# Patient Record
Sex: MALE | Employment: FULL TIME | URBAN - METROPOLITAN AREA
[De-identification: names, ages, dates, MRNs, and addresses within clinical notes are randomized per-mention and may not be internally consistent; named-entity substitution may affect disease eponyms.]

---

## 2017-02-28 ENCOUNTER — OFFICE VISIT (OUTPATIENT)
Dept: FAMILY MEDICINE CLINIC | Age: 37
End: 2017-02-28

## 2017-02-28 VITALS
HEART RATE: 72 BPM | TEMPERATURE: 97.9 F | DIASTOLIC BLOOD PRESSURE: 70 MMHG | RESPIRATION RATE: 12 BRPM | SYSTOLIC BLOOD PRESSURE: 102 MMHG | WEIGHT: 134 LBS | HEIGHT: 69 IN | BODY MASS INDEX: 19.85 KG/M2 | OXYGEN SATURATION: 99 %

## 2017-02-28 DIAGNOSIS — R10.31 RIGHT LOWER QUADRANT ABDOMINAL PAIN: ICD-10-CM

## 2017-02-28 DIAGNOSIS — R10.31 RIGHT LOWER QUADRANT ABDOMINAL PAIN: Primary | ICD-10-CM

## 2017-02-28 LAB
A/G RATIO: 2 (ref 1.1–2.2)
ALBUMIN SERPL-MCNC: 5.1 G/DL (ref 3.4–5)
ALP BLD-CCNC: 53 U/L (ref 40–129)
ALT SERPL-CCNC: 23 U/L (ref 10–40)
ANION GAP SERPL CALCULATED.3IONS-SCNC: 16 MMOL/L (ref 3–16)
AST SERPL-CCNC: 22 U/L (ref 15–37)
BASOPHILS ABSOLUTE: 0 K/UL (ref 0–0.2)
BASOPHILS RELATIVE PERCENT: 0.3 %
BILIRUB SERPL-MCNC: 0.6 MG/DL (ref 0–1)
BILIRUBIN, POC: ABNORMAL
BLOOD URINE, POC: ABNORMAL
BUN BLDV-MCNC: 14 MG/DL (ref 7–20)
CALCIUM SERPL-MCNC: 9.6 MG/DL (ref 8.3–10.6)
CHLORIDE BLD-SCNC: 99 MMOL/L (ref 99–110)
CLARITY, POC: CLEAR
CO2: 25 MMOL/L (ref 21–32)
COLOR, POC: YELLOW
CREAT SERPL-MCNC: 0.9 MG/DL (ref 0.9–1.3)
EOSINOPHILS ABSOLUTE: 0 K/UL (ref 0–0.6)
EOSINOPHILS RELATIVE PERCENT: 0.5 %
GFR AFRICAN AMERICAN: >60
GFR NON-AFRICAN AMERICAN: >60
GLOBULIN: 2.6 G/DL
GLUCOSE BLD-MCNC: 88 MG/DL (ref 70–99)
GLUCOSE URINE, POC: ABNORMAL
HCT VFR BLD CALC: 43.1 % (ref 40.5–52.5)
HEMOGLOBIN: 14.8 G/DL (ref 13.5–17.5)
KETONES, POC: ABNORMAL
LEUKOCYTE EST, POC: ABNORMAL
LYMPHOCYTES ABSOLUTE: 2.4 K/UL (ref 1–5.1)
LYMPHOCYTES RELATIVE PERCENT: 43.6 %
MCH RBC QN AUTO: 31.7 PG (ref 26–34)
MCHC RBC AUTO-ENTMCNC: 34.4 G/DL (ref 31–36)
MCV RBC AUTO: 92.2 FL (ref 80–100)
MONOCYTES ABSOLUTE: 0.5 K/UL (ref 0–1.3)
MONOCYTES RELATIVE PERCENT: 9.1 %
NEUTROPHILS ABSOLUTE: 2.6 K/UL (ref 1.7–7.7)
NEUTROPHILS RELATIVE PERCENT: 46.5 %
NITRITE, POC: ABNORMAL
PDW BLD-RTO: 12.9 % (ref 12.4–15.4)
PH, POC: 5.5
PLATELET # BLD: 277 K/UL (ref 135–450)
PMV BLD AUTO: 7.4 FL (ref 5–10.5)
POTASSIUM SERPL-SCNC: 4.2 MMOL/L (ref 3.5–5.1)
PROTEIN, POC: ABNORMAL
RBC # BLD: 4.67 M/UL (ref 4.2–5.9)
SODIUM BLD-SCNC: 140 MMOL/L (ref 136–145)
SPECIFIC GRAVITY, POC: 1.03
TOTAL PROTEIN: 7.7 G/DL (ref 6.4–8.2)
UROBILINOGEN, POC: 0.2
WBC # BLD: 5.5 K/UL (ref 4–11)

## 2017-02-28 PROCEDURE — 99202 OFFICE O/P NEW SF 15 MIN: CPT | Performed by: FAMILY MEDICINE

## 2017-02-28 PROCEDURE — 81001 URINALYSIS AUTO W/SCOPE: CPT | Performed by: FAMILY MEDICINE

## 2017-03-01 LAB
BACTERIA: ABNORMAL /HPF
BILIRUBIN URINE: NEGATIVE
BLOOD, URINE: NEGATIVE
CLARITY: CLEAR
COLOR: ABNORMAL
EPITHELIAL CELLS, UA: ABNORMAL /HPF
GLUCOSE URINE: NEGATIVE MG/DL
KETONES, URINE: NEGATIVE MG/DL
LEUKOCYTE ESTERASE, URINE: NEGATIVE
MICROSCOPIC EXAMINATION: ABNORMAL
MUCUS: ABNORMAL /LPF
NITRITE, URINE: NEGATIVE
PH UA: 5.5
PROTEIN UA: NEGATIVE MG/DL
RBC UA: ABNORMAL /HPF (ref 0–2)
SPECIFIC GRAVITY UA: 1.03
UROBILINOGEN, URINE: 0.2 E.U./DL
WBC UA: ABNORMAL /HPF (ref 0–5)

## 2017-03-03 ASSESSMENT — ENCOUNTER SYMPTOMS
RESPIRATORY NEGATIVE: 1
ABDOMINAL PAIN: 1

## 2017-05-19 ENCOUNTER — OFFICE VISIT (OUTPATIENT)
Dept: FAMILY MEDICINE CLINIC | Age: 37
End: 2017-05-19

## 2017-05-19 VITALS
BODY MASS INDEX: 19.76 KG/M2 | OXYGEN SATURATION: 98 % | TEMPERATURE: 97.7 F | WEIGHT: 133.4 LBS | DIASTOLIC BLOOD PRESSURE: 62 MMHG | HEART RATE: 70 BPM | SYSTOLIC BLOOD PRESSURE: 98 MMHG | RESPIRATION RATE: 16 BRPM | HEIGHT: 69 IN

## 2017-05-19 DIAGNOSIS — F34.1 DYSTHYMIA: ICD-10-CM

## 2017-05-19 DIAGNOSIS — Z00.00 WELL ADULT ON ROUTINE HEALTH CHECK: Primary | ICD-10-CM

## 2017-05-19 DIAGNOSIS — Z00.00 WELL ADULT ON ROUTINE HEALTH CHECK: ICD-10-CM

## 2017-05-19 LAB
CHOLESTEROL, TOTAL: 167 MG/DL (ref 0–199)
HDLC SERPL-MCNC: 51 MG/DL (ref 40–60)
LDL CHOLESTEROL CALCULATED: 103 MG/DL
TRIGL SERPL-MCNC: 64 MG/DL (ref 0–150)
TSH REFLEX FT4: 1.9 UIU/ML (ref 0.27–4.2)
VITAMIN D 25-HYDROXY: 18.7 NG/ML
VLDLC SERPL CALC-MCNC: 13 MG/DL

## 2017-05-19 PROCEDURE — 99395 PREV VISIT EST AGE 18-39: CPT | Performed by: FAMILY MEDICINE

## 2017-05-19 PROCEDURE — 96160 PT-FOCUSED HLTH RISK ASSMT: CPT | Performed by: FAMILY MEDICINE

## 2017-05-19 ASSESSMENT — PATIENT HEALTH QUESTIONNAIRE - PHQ9
10. IF YOU CHECKED OFF ANY PROBLEMS, HOW DIFFICULT HAVE THESE PROBLEMS MADE IT FOR YOU TO DO YOUR WORK, TAKE CARE OF THINGS AT HOME, OR GET ALONG WITH OTHER PEOPLE: 0
1. LITTLE INTEREST OR PLEASURE IN DOING THINGS: 1
8. MOVING OR SPEAKING SO SLOWLY THAT OTHER PEOPLE COULD HAVE NOTICED. OR THE OPPOSITE, BEING SO FIGETY OR RESTLESS THAT YOU HAVE BEEN MOVING AROUND A LOT MORE THAN USUAL: 0
3. TROUBLE FALLING OR STAYING ASLEEP: 3
SUM OF ALL RESPONSES TO PHQ QUESTIONS 1-9: 7
2. FEELING DOWN, DEPRESSED OR HOPELESS: 2
4. FEELING TIRED OR HAVING LITTLE ENERGY: 1
7. TROUBLE CONCENTRATING ON THINGS, SUCH AS READING THE NEWSPAPER OR WATCHING TELEVISION: 0
5. POOR APPETITE OR OVEREATING: 0
SUM OF ALL RESPONSES TO PHQ9 QUESTIONS 1 & 2: 3

## 2017-05-20 PROBLEM — F34.1 DYSTHYMIA: Status: ACTIVE | Noted: 2017-05-20

## 2017-05-20 ASSESSMENT — ENCOUNTER SYMPTOMS
RESPIRATORY NEGATIVE: 1
EYES NEGATIVE: 1
GASTROINTESTINAL NEGATIVE: 1

## 2017-05-21 DIAGNOSIS — E55.9 VITAMIN D DEFICIENCY: Primary | ICD-10-CM

## 2017-05-21 LAB — HIV-1 AND HIV-2 ANTIBODIES: NEGATIVE

## 2017-05-21 RX ORDER — ERGOCALCIFEROL 1.25 MG/1
50000 CAPSULE ORAL WEEKLY
Qty: 12 CAPSULE | Refills: 0 | Status: SHIPPED | OUTPATIENT
Start: 2017-05-21 | End: 2021-08-11

## 2018-06-12 ENCOUNTER — OFFICE VISIT (OUTPATIENT)
Dept: FAMILY MEDICINE CLINIC | Age: 38
End: 2018-06-12

## 2018-06-12 VITALS
RESPIRATION RATE: 12 BRPM | WEIGHT: 134 LBS | BODY MASS INDEX: 19.79 KG/M2 | TEMPERATURE: 99 F | DIASTOLIC BLOOD PRESSURE: 74 MMHG | SYSTOLIC BLOOD PRESSURE: 120 MMHG | HEART RATE: 78 BPM

## 2018-06-12 DIAGNOSIS — L03.119 CELLULITIS OF LOWER EXTREMITY, UNSPECIFIED LATERALITY: ICD-10-CM

## 2018-06-12 DIAGNOSIS — R21 ACUTE BLISTERING ERUPTION OF SKIN: Primary | ICD-10-CM

## 2018-06-12 PROCEDURE — G8427 DOCREV CUR MEDS BY ELIG CLIN: HCPCS | Performed by: FAMILY MEDICINE

## 2018-06-12 PROCEDURE — 1036F TOBACCO NON-USER: CPT | Performed by: FAMILY MEDICINE

## 2018-06-12 PROCEDURE — G8420 CALC BMI NORM PARAMETERS: HCPCS | Performed by: FAMILY MEDICINE

## 2018-06-12 PROCEDURE — 99213 OFFICE O/P EST LOW 20 MIN: CPT | Performed by: FAMILY MEDICINE

## 2018-06-12 RX ORDER — BACITRACIN ZINC AND POLYMYXIN B SULFATE 500; 1000 [USP'U]/G; [USP'U]/G
OINTMENT TOPICAL
Qty: 30 G | Refills: 0 | Status: SHIPPED | OUTPATIENT
Start: 2018-06-12 | End: 2018-06-21 | Stop reason: SDUPTHER

## 2018-06-12 RX ORDER — SULFAMETHOXAZOLE AND TRIMETHOPRIM 800; 160 MG/1; MG/1
1 TABLET ORAL 2 TIMES DAILY
Qty: 20 TABLET | Refills: 0 | Status: SHIPPED | OUTPATIENT
Start: 2018-06-12 | End: 2018-06-22

## 2018-06-12 RX ORDER — CEPHALEXIN 500 MG/1
CAPSULE ORAL
COMMUNITY
Start: 2018-06-07 | End: 2021-08-11

## 2018-06-17 ASSESSMENT — ENCOUNTER SYMPTOMS: RESPIRATORY NEGATIVE: 1

## 2018-06-21 ENCOUNTER — OFFICE VISIT (OUTPATIENT)
Dept: FAMILY MEDICINE CLINIC | Age: 38
End: 2018-06-21

## 2018-06-21 VITALS
BODY MASS INDEX: 19.4 KG/M2 | DIASTOLIC BLOOD PRESSURE: 58 MMHG | RESPIRATION RATE: 16 BRPM | HEIGHT: 69 IN | WEIGHT: 131 LBS | HEART RATE: 83 BPM | SYSTOLIC BLOOD PRESSURE: 110 MMHG | TEMPERATURE: 98 F | OXYGEN SATURATION: 98 %

## 2018-06-21 DIAGNOSIS — L03.119 CELLULITIS OF LOWER EXTREMITY, UNSPECIFIED LATERALITY: Primary | ICD-10-CM

## 2018-06-21 PROCEDURE — G8427 DOCREV CUR MEDS BY ELIG CLIN: HCPCS | Performed by: FAMILY MEDICINE

## 2018-06-21 PROCEDURE — G8420 CALC BMI NORM PARAMETERS: HCPCS | Performed by: FAMILY MEDICINE

## 2018-06-21 PROCEDURE — 1036F TOBACCO NON-USER: CPT | Performed by: FAMILY MEDICINE

## 2018-06-21 PROCEDURE — 99213 OFFICE O/P EST LOW 20 MIN: CPT | Performed by: FAMILY MEDICINE

## 2018-06-21 RX ORDER — HYDROXYZINE HYDROCHLORIDE 25 MG/1
25 TABLET, FILM COATED ORAL 3 TIMES DAILY PRN
Qty: 15 TABLET | Refills: 0 | Status: SHIPPED | OUTPATIENT
Start: 2018-06-21 | End: 2018-07-01

## 2018-06-21 RX ORDER — BACITRACIN ZINC AND POLYMYXIN B SULFATE 500; 1000 [USP'U]/G; [USP'U]/G
OINTMENT TOPICAL
Qty: 30 G | Refills: 0 | Status: SHIPPED | OUTPATIENT
Start: 2018-06-21 | End: 2018-06-28

## 2018-06-27 ASSESSMENT — ENCOUNTER SYMPTOMS: RESPIRATORY NEGATIVE: 1

## 2021-08-08 NOTE — PROGRESS NOTES
Gianna Bryan   47 y.o. male   1980    This is patient's first visit with me. Gianna Bryan is here to establish care as their new PCP. Gianna Bryan has a PMH significant for:     Patient Active Problem List   Diagnosis    Dysthymia       Reason(s) for visit:   Chief Complaint   Patient presents with    Annual Exam    Established New Doctor       HPI:    Office visit encounter:    Patient has history of remote anxiety/depression. He has only been on Hydroxyzine PRN back in June 2018. He stated that lately, he has had no issues. Has had chronic sleep issues. No problems with falling asleep. He stated he's a light sleeper and it needs to be quiet and dark. He stated that he often wakes up around 3:30 AM and 4 AM.  He denied issues with nocturia > 2x. Works from 8:30 AM-5 PM.    Regarding his occupation, patient works for [employer] back in Nov 2020. Of note, no history of COVID-19. He is fully vaccinated against COVID-19. PDMP monitoring:  -Revealed no controlled medications written of any kind.    -Last report:   Last PDMP Ke Molina as Reviewed [facility]):  Review User Review Instant Review Result   [address], [address] 8/8/2021  6:07 PM Reviewed PDMP [1]       Allergies   Allergen Reactions    Seasonal        No current outpatient medications on file prior to visit. No current facility-administered medications on file prior to visit.         Family History   Problem Relation Age of Onset    Glaucoma Mother 79    Crohn's Disease Mother     Diabetes type 2  Father 46       Social History     Tobacco Use    Smoking status: Never Smoker    Smokeless tobacco: Never Used   Substance Use Topics    Alcohol use: Yes        Lab Results   Component Value Date    WBC 5.5 02/28/2017    HGB 14.8 02/28/2017    HCT 43.1 02/28/2017    MCV 92.2 02/28/2017     02/28/2017       Chemistry        Component Value Date/Time     02/28/2017 1643    K 4.2 02/28/2017 1643    CL 99 02/28/2017 1643    CO2 25 02/28/2017 1643    BUN 14 02/28/2017 1643    CREATININE 0.9 02/28/2017 1643        Component Value Date/Time    CALCIUM 9.6 02/28/2017 1643    ALKPHOS 53 02/28/2017 1643    AST 22 02/28/2017 1643    ALT 23 02/28/2017 1643    BILITOT 0.6 02/28/2017 1643          Lab Results   Component Value Date    ALT 23 02/28/2017    AST 22 02/28/2017    ALKPHOS 53 02/28/2017    BILITOT 0.6 02/28/2017     No results found for: LABA1C  No results found for: EAG    Review of Systems   Constitutional: Negative for activity change, appetite change, fatigue, fever and unexpected weight change. HENT: Negative for congestion, rhinorrhea, sinus pressure and trouble swallowing. Respiratory: Negative for cough, chest tightness, shortness of breath and wheezing. Cardiovascular: Negative for chest pain, palpitations and leg swelling. Gastrointestinal: Negative for abdominal distention, abdominal pain, blood in stool, constipation, diarrhea, nausea and vomiting. Genitourinary: Negative for dysuria, frequency and hematuria. Musculoskeletal: Negative for arthralgias and back pain. Skin: Negative for rash. Neurological: Negative for dizziness, weakness, light-headedness, numbness and headaches. Psychiatric/Behavioral: Positive for sleep disturbance. Wt Readings from Last 3 Encounters:   08/11/21 138 lb 6.4 oz (62.8 kg)   06/21/18 131 lb (59.4 kg)   06/12/18 134 lb (60.8 kg)       BP Readings from Last 3 Encounters:   08/11/21 100/64   06/21/18 (!) 110/58   06/12/18 120/74       Pulse Readings from Last 3 Encounters:   08/11/21 72   06/21/18 83   06/12/18 78       /64   Pulse 72   Temp 97.5 °F (36.4 °C)   Ht 5' 9\" (1.753 m)   Wt 138 lb 6.4 oz (62.8 kg)   SpO2 98%   BMI 20.44 kg/m²      Physical Exam  Vitals reviewed. Constitutional:       General: He is awake. He is not in acute distress. Appearance: He is not ill-appearing or diaphoretic. HENT:      Head: Normocephalic and atraumatic. No abrasion or masses. Hair is normal.      Right Ear: External ear normal.      Left Ear: External ear normal.      Nose: Nose normal.   Eyes:      General: Lids are normal. Gaze aligned appropriately. No scleral icterus. Right eye: No discharge. Left eye: No discharge. Extraocular Movements: Extraocular movements intact. Conjunctiva/sclera: Conjunctivae normal.   Neck:      Trachea: Phonation normal.   Cardiovascular:      Rate and Rhythm: Normal rate and regular rhythm. Pulmonary:      Effort: Pulmonary effort is normal. No respiratory distress. Breath sounds: No wheezing, rhonchi or rales. Abdominal:      General: Abdomen is flat. There is no distension. Palpations: Abdomen is soft. Musculoskeletal:         General: No deformity. Normal range of motion. Cervical back: Normal range of motion. No erythema. Right lower leg: No edema. Left lower leg: No edema. Skin:     Coloration: Skin is not cyanotic, jaundiced or pale. Findings: No abrasion, abscess, bruising, ecchymosis, erythema, signs of injury, laceration, lesion, petechiae, rash or wound. Neurological:      General: No focal deficit present. Mental Status: He is alert. Mental status is at baseline. GCS: GCS eye subscore is 4. GCS verbal subscore is 5. GCS motor subscore is 6. Cranial Nerves: No cranial nerve deficit, dysarthria or facial asymmetry. Motor: No weakness, tremor, atrophy or seizure activity. Coordination: Coordination normal.      Gait: Gait is intact. Psychiatric:         Attention and Perception: Attention and perception normal.         Mood and Affect: Mood and affect normal.         Speech: Speech normal.         Behavior: Behavior normal. Behavior is cooperative. Thought Content: Thought content normal.       Assessment/Plan:   Ruy Pressley was seen today for annual exam and established new doctor.     Diagnoses and all orders for this visit:    Encounter for preventative adult health care examination  -     CBC Auto Differential; Future  -     Hemoglobin A1C; Future  -     TSH without Reflex; Future  -     T4, Free; Future  -     Renal Function Panel; Future  -     Hepatic Function Panel; Future  -     Lipid Panel; Future    Vitamin D deficiency  -     VITAMIN D 25 HYDROXY; Future    Encounter to establish care with new doctor    Educated about 2019 novel coronavirus infection       I reviewed the plan of care with the patient. Patient acknowledged understanding and agreed with plan of care overall. Medications Discontinued During This Encounter   Medication Reason    cephALEXin (KEFLEX) 500 MG capsule LIST CLEANUP    mupirocin (BACTROBAN) 2 % ointment LIST CLEANUP    vitamin D (ERGOCALCIFEROL) 48381 UNITS CAPS capsule LIST CLEANUP        General information on medications:  -When it comes to medications, whether with starting or adding a new medication or increasing the dose of a current medication, the benefits and risks have to always be considered and weighed over, especially if one is taking other medications as well. -There are no medications that have no side effects and that there is always a risk involved with taking a medication.    -If a side effect were to occur with starting a new medication or with increasing the dose of a current medication that either the medication can be totally discontinued altogether or simply decrease the dose of it and if this would be the case a follow-up appointment would be deemed necessary.    -The drug allergy list will then be updated with the corresponding side effect(s) if it's deemed to be a true 'drug allergy'.     -The most common adverse effects of medication(s) were addressed at today's visit.    -Lastly, the coverage status of a medication may vary from insurance to insurance and the only way to verify if the medication is covered is to send an actual prescription in.    -The drug formulary of each insurance changes without any warning or notification to the healthcare provider let alone the pharmacy.  -The cost of medications vary from insurance to insurance and the cost is always subject to change just like the drug formulary. Level of service (LOS):   -Multiple variables/factors are considered with determining LOS: duration of time spent with patient, time spent reviewing patient's labs, notes (including my own and other specialist's notes if relevant), number of issues addressed during the visit, answering any questions/concerns brought up by the patient, and the overall complexity and decision making regarding the issues addressed during the visit, etc.  -Length of visit is one factor (not the main one) in determining the LOS code selected. -There are different codes to distinguish new patient vs old (established) patient.  -There is a specific LOS/code for a (annual) physical (otherwise known as a wellness visit or biometric screening) can only be used once a year. In addition, the healthcare provider will determine whether the current visit can be considered a 'physical.' Discussing about specific issues/concerns, especially if those issues/concerns are new does not constitute as a 'physical' visit. Follow-up: No follow-ups on file. .     Patient was informed that if his or her symptoms worsen to follow up with me sooner or go to the nearest ER if the symptoms are very significant and warrant higher level of care. Regarding my note:  -This note was composed (by me only and not with assistance via a scribe) to the best of my knowledge and recollection of the encounter with the patient using one of my own customized note templates utilizing a combination of typing and dictating with the [address] [place] [address] speech recognition software.   As a result, the note may possibly have various errors (e.g. spelling, grammar, and non-sensible words/phrases/statements) despite reviewing the note prior to signing it for completion.    -It is worth noting that most of the time, progress notes are completed usually long after the patient was seen. Every effort is made to have notes as well as other things that needed to be attended to (e.g. medication refills, MyChart messages, documents that require reviewing, etc.) be addressed and completed in a timely fashion (ideally within 72 hours of the patient encounter). -It is also worth noting that healthcare providers often see several patients a day (e.g. > 15-30 patients/day) in either the outpatient and/or inpatient setting(s). In addition, healthcare providers spend a significant amount of time reviewing multiple patients' charts in preparation for their future encounters (pre-charting) as well as time spent reviewing any labs, imaging, specialist's notes (if relevant), and other documents (e.g. recent ER visits or hospital stays or completing forms such as FMLA, short-term/long-term disability), etc.  Time is also allocated to attending to patient's messages on [employer], phone calls from various people, attending to prescription refills/orders, and also attending meetings or conferences throughout the day. Time and effort is also allocated to coordinating with office staff to make sure various things are completed as part of the day-to-day office management responsibilities. For the most part, substantial portion of each given day is usually spent with direct patient care followed by documenting.  -Given all this, it is worth pointing out that not every detail of the encounter can be remembered and not everything discussed is considered relevant, significant, or noteworthy. It is also worth mentioning that my recollection of the visit may differ from the respective patient's recollection of the visit.   This note is primarily written for myself (the healthcare provider) utilizing one of my own customized templates so I can recall what

## 2021-08-11 ENCOUNTER — OFFICE VISIT (OUTPATIENT)
Dept: FAMILY MEDICINE CLINIC | Age: 41
End: 2021-08-11
Payer: COMMERCIAL

## 2021-08-11 VITALS
HEART RATE: 72 BPM | HEIGHT: 69 IN | WEIGHT: 138.4 LBS | TEMPERATURE: 97.5 F | BODY MASS INDEX: 20.5 KG/M2 | OXYGEN SATURATION: 98 % | DIASTOLIC BLOOD PRESSURE: 64 MMHG | SYSTOLIC BLOOD PRESSURE: 100 MMHG

## 2021-08-11 DIAGNOSIS — E55.9 VITAMIN D DEFICIENCY: ICD-10-CM

## 2021-08-11 DIAGNOSIS — Z76.89 ENCOUNTER TO ESTABLISH CARE WITH NEW DOCTOR: ICD-10-CM

## 2021-08-11 DIAGNOSIS — Z00.00 ENCOUNTER FOR PREVENTATIVE ADULT HEALTH CARE EXAMINATION: Primary | ICD-10-CM

## 2021-08-11 DIAGNOSIS — Z71.89 EDUCATED ABOUT 2019 NOVEL CORONAVIRUS INFECTION: ICD-10-CM

## 2021-08-11 PROCEDURE — 99385 PREV VISIT NEW AGE 18-39: CPT | Performed by: FAMILY MEDICINE

## 2021-08-11 ASSESSMENT — PATIENT HEALTH QUESTIONNAIRE - PHQ9
SUM OF ALL RESPONSES TO PHQ QUESTIONS 1-9: 2
1. LITTLE INTEREST OR PLEASURE IN DOING THINGS: 1
SUM OF ALL RESPONSES TO PHQ QUESTIONS 1-9: 2
SUM OF ALL RESPONSES TO PHQ9 QUESTIONS 1 & 2: 2
SUM OF ALL RESPONSES TO PHQ QUESTIONS 1-9: 2
2. FEELING DOWN, DEPRESSED OR HOPELESS: 1

## 2021-08-11 ASSESSMENT — ENCOUNTER SYMPTOMS
SHORTNESS OF BREATH: 0
NAUSEA: 0
TROUBLE SWALLOWING: 0
BACK PAIN: 0
DIARRHEA: 0
ABDOMINAL PAIN: 0
SINUS PRESSURE: 0
ABDOMINAL DISTENTION: 0
BLOOD IN STOOL: 0
VOMITING: 0
RHINORRHEA: 0
CONSTIPATION: 0
COUGH: 0
WHEEZING: 0
CHEST TIGHTNESS: 0

## 2021-10-25 NOTE — PROGRESS NOTES
Elva Callahan   46 y.o. male   1980    HPI:    Patient was last seen by me on 8/11/2021. During our last office visit:   -This was his first visit with me to establish care as new PCP. Reason(s) for visit:   Chief Complaint   Patient presents with   [facility] Mass     Neck. Has been there for about 3mo. Not painful. Patient reported of a mass of his neck that had appeared around end of June/early July. He denied itching, bleeding, discharge, scaling, pain, tingling, etc.  He has not applied anything to it and has not sought care until now. He can't recall if he was stung by anything because he woke up the with skin lesion. He's not allergic to bees/wasps or mosquitoes. He has no other skin lesions on his body. Patient expressed concern for cancer, which I assured him it's highly unlikely. Allergies   Allergen Reactions    Seasonal        No current outpatient medications on file prior to visit. No current facility-administered medications on file prior to visit.         Family History   Problem Relation Age of Onset    Glaucoma Mother 79    Crohn's Disease Mother     Diabetes type 2  Father 46       Social History     Tobacco Use    Smoking status: Never Smoker    Smokeless tobacco: Never Used   Substance Use Topics    Alcohol use: Yes        Lab Results   Component Value Date    WBC 5.5 02/28/2017    HGB 14.8 02/28/2017    HCT 43.1 02/28/2017    MCV 92.2 02/28/2017     02/28/2017       Chemistry        Component Value Date/Time     02/28/2017 1643    K 4.2 02/28/2017 1643    CL 99 02/28/2017 1643    CO2 25 02/28/2017 1643    BUN 14 02/28/2017 1643    CREATININE 0.9 02/28/2017 1643        Component Value Date/Time    CALCIUM 9.6 02/28/2017 1643    ALKPHOS 53 02/28/2017 1643    AST 22 02/28/2017 1643    ALT 23 02/28/2017 1643    BILITOT 0.6 02/28/2017 1643          Lab Results   Component Value Date    ALT 23 02/28/2017    AST 22 02/28/2017    ALKPHOS 53 02/28/2017    BILITOT 0.6 02/28/2017     No results found for: LABA1C  No results found for: EAG    Review of Systems   Constitutional: Negative for activity change, appetite change, fatigue, fever and unexpected weight change. HENT: Negative for congestion, rhinorrhea, sinus pressure and trouble swallowing. Respiratory: Negative for cough, chest tightness, shortness of breath and wheezing. Cardiovascular: Negative for chest pain, palpitations and leg swelling. Gastrointestinal: Negative for abdominal distention, abdominal pain, blood in stool, constipation, diarrhea, nausea and vomiting. Genitourinary: Negative for dysuria, frequency and hematuria. Musculoskeletal: Negative for arthralgias and back pain. Skin: Negative for rash. Neurological: Negative for dizziness, weakness, light-headedness, numbness and headaches. Wt Readings from Last 3 Encounters:   10/27/21 140 lb 12.8 oz (63.9 kg)   08/11/21 138 lb 6.4 oz (62.8 kg)   06/21/18 131 lb (59.4 kg)       BP Readings from Last 3 Encounters:   10/27/21 118/64   08/11/21 100/64   06/21/18 (!) 110/58       Pulse Readings from Last 3 Encounters:   10/27/21 62   08/11/21 72   06/21/18 83       /64   Pulse 62   Temp 97.8 °F (36.6 °C)   Wt 140 lb 12.8 oz (63.9 kg)   SpO2 99%   BMI 20.79 kg/m²      Physical Exam  Vitals reviewed. Constitutional:       General: He is awake. He is not in acute distress. Appearance: He is not ill-appearing or diaphoretic. HENT:      Head: Normocephalic and atraumatic. No abrasion or masses. Hair is normal.      Right Ear: External ear normal.      Left Ear: External ear normal.      Nose: Nose normal.   Eyes:      General: Lids are normal. Gaze aligned appropriately. No scleral icterus. Right eye: No discharge. Left eye: No discharge. Extraocular Movements: Extraocular movements intact.       Conjunctiva/sclera: Conjunctivae normal.   Neck:      Trachea: Phonation normal.   Cardiovascular:      Rate and Rhythm: Normal rate and regular rhythm. Pulmonary:      Effort: Pulmonary effort is normal. No respiratory distress. Breath sounds: No wheezing, rhonchi or rales. Abdominal:      General: Abdomen is flat. There is no distension. Palpations: Abdomen is soft. Musculoskeletal:         General: No deformity. Normal range of motion. Cervical back: Normal range of motion. No erythema. Right lower leg: No edema. Left lower leg: No edema. Skin:     Coloration: Skin is not cyanotic, jaundiced or pale. Findings: Lesion present. No abrasion, abscess, bruising, ecchymosis, erythema, signs of injury, laceration, petechiae, rash or wound. Neurological:      General: No focal deficit present. Mental Status: He is alert. Mental status is at baseline. GCS: GCS eye subscore is 4. GCS verbal subscore is 5. GCS motor subscore is 6. Cranial Nerves: No cranial nerve deficit, dysarthria or facial asymmetry. Motor: No weakness, tremor, atrophy or seizure activity. Coordination: Coordination normal.      Gait: Gait is intact. Psychiatric:         Attention and Perception: Attention and perception normal.         Mood and Affect: Mood and affect normal.         Speech: Speech normal.         Behavior: Behavior normal. Behavior is cooperative. Thought Content: Thought content normal.             Imaging was obtained with patient's consent. Patient was informed that the pictures were taken with my own personal mobile phone and uploaded via mobile [employer] (called [employer]) directly onto the patient's electronic health chart. Patient was also informed that any images obtained would not be saved on my mobile phone. The patient was shown the image(s) in their chart. Assessment/Plan:   Anthony Peacock was seen today for mass. Diagnoses and all orders for this visit:    Subcutaneous nodule of neck  Comments:  Likely related to an insect/bee/wasp sting.  Reassured patient that it's not

## 2021-10-27 ENCOUNTER — OFFICE VISIT (OUTPATIENT)
Dept: FAMILY MEDICINE CLINIC | Age: 41
End: 2021-10-27
Payer: COMMERCIAL

## 2021-10-27 VITALS
TEMPERATURE: 97.8 F | HEART RATE: 62 BPM | SYSTOLIC BLOOD PRESSURE: 118 MMHG | BODY MASS INDEX: 20.79 KG/M2 | DIASTOLIC BLOOD PRESSURE: 64 MMHG | WEIGHT: 140.8 LBS | OXYGEN SATURATION: 99 %

## 2021-10-27 DIAGNOSIS — R22.1 SUBCUTANEOUS NODULE OF NECK: Primary | ICD-10-CM

## 2021-10-27 PROCEDURE — G8427 DOCREV CUR MEDS BY ELIG CLIN: HCPCS | Performed by: FAMILY MEDICINE

## 2021-10-27 PROCEDURE — 99212 OFFICE O/P EST SF 10 MIN: CPT | Performed by: FAMILY MEDICINE

## 2021-10-27 PROCEDURE — 1036F TOBACCO NON-USER: CPT | Performed by: FAMILY MEDICINE

## 2021-10-27 PROCEDURE — G8482 FLU IMMUNIZE ORDER/ADMIN: HCPCS | Performed by: FAMILY MEDICINE

## 2021-10-27 PROCEDURE — G8420 CALC BMI NORM PARAMETERS: HCPCS | Performed by: FAMILY MEDICINE

## 2021-10-27 RX ORDER — TRIAMCINOLONE ACETONIDE 0.25 MG/G
OINTMENT TOPICAL
Qty: 15 G | Refills: 1 | Status: SHIPPED | OUTPATIENT
Start: 2021-10-27 | End: 2021-11-03

## 2021-10-27 ASSESSMENT — ENCOUNTER SYMPTOMS
ABDOMINAL PAIN: 0
RHINORRHEA: 0
CONSTIPATION: 0
BLOOD IN STOOL: 0
CHEST TIGHTNESS: 0
VOMITING: 0
SHORTNESS OF BREATH: 0
SINUS PRESSURE: 0
COUGH: 0
NAUSEA: 0
DIARRHEA: 0
TROUBLE SWALLOWING: 0
WHEEZING: 0
BACK PAIN: 0
ABDOMINAL DISTENTION: 0

## 2022-07-30 NOTE — PROGRESS NOTES
Sandeep Ortiz   46 y.o. male   1980    HPI:    Patient was last seen by me on 10/27/2021. Reason(s) for visit:   Chief Complaint   Patient presents with    Annual Exam     Health maintenance:    Patient has had no significant change in health. He had no concerns that he wanted to talk about with exception of the same neck mass that he had reported to me. He stated that it has come and go, but it got bigger a few weeks ago. He borrowed leftover Mupirocin and it has helped him over the past 1.5 weeks. We also discussed had issues with a scratchy throat for the past 17 years. He stated that it feels like there's a peanut there at times. He denied issues with dysphagia or odynphagia. 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 radhika), but has noticed that he loses his voice easier and becomes raspy. Denied issues with heartburn, regurgitation. Denied hx of smoking tobacco. No hx of repeat viral/bacterial pharyngitis. BP:  -BP in office: WNL  -Patient denied issues with frequent headache, chest pain, shortness of breath, palpitations, malaise, etc.    Weight/BMI:  Wt Readings from Last 3 Encounters:   08/03/22 139 lb 6.4 oz (63.2 kg)   10/27/21 140 lb 12.8 oz (63.9 kg)   08/11/21 138 lb 6.4 oz (62.8 kg)     -Physical activity:     Vision exam:   -Last exam: end of summer 2022  -No visual disturbances reported    Dental exam:  -Last exam: UTD  -No issues reported of dental pain, bleeding gums, temperature sensitivity, etc.     Other:  -Patient stated he got his 3rd dose of Moderna COVID-19 vaccine.     Health Maintenance Due   Topic Date Due    Varicella vaccine (1 of 2 - 2-dose childhood series) Never done    Hepatitis C screen  Never done    COVID-19 Vaccine (3 - Booster for Moderna series) 09/30/2021    Depression Monitoring  08/11/2022       Immunization History   Administered Date(s) Administered    COVID-19, MODERNA BLUE border, Primary or Immunocompromised, (age 12y+), IM, 100 mcg/0.5mL 04/02/2021, 04/30/2021    Influenza, MDCK Quadv, IM, PF (Flucelvax 2 yrs and older) 10/16/2018    Influenza, Quadv, IM, PF (6 mo and older Fluzone, Flulaval, Fluarix, and 3 yrs and older Afluria) 10/25/2017, 10/16/2019    Influenza, Quadv, Recombinant, IM PF (Flublok 18 yrs and older) 10/18/2020, 10/15/2021    Tdap (Boostrix, Adacel) 04/01/2014       Allergies   Allergen Reactions    Seasonal        No current outpatient medications on file prior to visit. No current facility-administered medications on file prior to visit. Family History   Problem Relation Age of Onset    Glaucoma Mother 79    Crohn's Disease Mother     Diabetes type 2  Father 46       Social History     Tobacco Use    Smoking status: Never    Smokeless tobacco: Never   Substance Use Topics    Alcohol use: Yes        Lab Results   Component Value Date    WBC 5.5 02/28/2017    HGB 14.8 02/28/2017    HCT 43.1 02/28/2017    MCV 92.2 02/28/2017     02/28/2017       Chemistry        Component Value Date/Time     02/28/2017 1643    K 4.2 02/28/2017 1643    CL 99 02/28/2017 1643    CO2 25 02/28/2017 1643    BUN 14 02/28/2017 1643    CREATININE 0.9 02/28/2017 1643        Component Value Date/Time    CALCIUM 9.6 02/28/2017 1643    ALKPHOS 53 02/28/2017 1643    AST 22 02/28/2017 1643    ALT 23 02/28/2017 1643    BILITOT 0.6 02/28/2017 1643          Lab Results   Component Value Date    ALT 23 02/28/2017    AST 22 02/28/2017    ALKPHOS 53 02/28/2017    BILITOT 0.6 02/28/2017     No results found for: LABA1C  No results found for: EAG    Review of Systems   Constitutional:  Negative for activity change, appetite change, fatigue, fever and unexpected weight change. HENT:  Negative for congestion, rhinorrhea, sinus pressure and trouble swallowing. Respiratory:  Negative for cough, chest tightness, shortness of breath and wheezing. Cardiovascular:  Negative for chest pain, palpitations and leg swelling. Gastrointestinal:  Negative for abdominal distention, abdominal pain, blood in stool, constipation, diarrhea, nausea and vomiting. Genitourinary:  Negative for dysuria, frequency and hematuria. Musculoskeletal:  Negative for arthralgias and back pain. Skin:  Negative for rash. Neurological:  Negative for dizziness, weakness, light-headedness, numbness and headaches. Wt Readings from Last 3 Encounters:   08/03/22 139 lb 6.4 oz (63.2 kg)   10/27/21 140 lb 12.8 oz (63.9 kg)   08/11/21 138 lb 6.4 oz (62.8 kg)       BP Readings from Last 3 Encounters:   08/03/22 122/64   10/27/21 118/64   08/11/21 100/64       Pulse Readings from Last 3 Encounters:   08/03/22 67   10/27/21 62   08/11/21 72       /64   Pulse 67   Temp 98 °F (36.7 °C)   Wt 139 lb 6.4 oz (63.2 kg)   SpO2 98%   BMI 20.59 kg/m²      Physical Exam  Vitals reviewed. Constitutional:       General: He is awake. He is not in acute distress. Appearance: He is not ill-appearing or diaphoretic. HENT:      Head: Normocephalic and atraumatic. No abrasion or masses. Hair is normal.      Comments: Oropharynx: increased vascularity - no vesicles, lesions, mass, purulece, bleeding, etc.     Right Ear: External ear normal.      Left Ear: External ear normal.      Nose: Nose normal.   Eyes:      General: Lids are normal. Gaze aligned appropriately. No scleral icterus. Right eye: No discharge. Left eye: No discharge. Extraocular Movements: Extraocular movements intact. Conjunctiva/sclera: Conjunctivae normal.   Neck:      Trachea: Phonation normal.      Comments: Right lateral posterior neck - 1.25x1.25 cm elliptical shaped nodule - pus was elicited with squeezing  Cardiovascular:      Rate and Rhythm: Normal rate and regular rhythm. Pulmonary:      Effort: Pulmonary effort is normal. No respiratory distress. Breath sounds: No wheezing, rhonchi or rales. Abdominal:      General: Abdomen is flat.  There is no distension. Palpations: Abdomen is soft. Musculoskeletal:         General: No deformity. Normal range of motion. Cervical back: Normal range of motion. No erythema. Right lower leg: No edema. Left lower leg: No edema. Skin:     Coloration: Skin is not cyanotic, jaundiced or pale. Findings: No abrasion, abscess, bruising, ecchymosis, erythema, signs of injury, laceration, lesion, petechiae, rash or wound. Neurological:      General: No focal deficit present. Mental Status: He is alert. Mental status is at baseline. GCS: GCS eye subscore is 4. GCS verbal subscore is 5. GCS motor subscore is 6. Cranial Nerves: No cranial nerve deficit, dysarthria or facial asymmetry. Motor: No weakness, tremor, atrophy or seizure activity. Coordination: Coordination normal.      Gait: Gait is intact. Psychiatric:         Attention and Perception: Attention and perception normal.         Mood and Affect: Mood and affect normal.         Speech: Speech normal.         Behavior: Behavior normal. Behavior is cooperative. Thought Content: Thought content normal.        Assessment/Plan:   Dominga Sosa was seen today for annual exam.    Diagnoses and all orders for this visit:    Encounter for preventative adult health care examination  -     CBC with Auto Differential; Future  -     Hemoglobin A1C; Future  -     TSH; Future  -     T4, Free; Future  -     Renal Function Panel; Future  -     Hepatic Function Panel; Future  -     Lipid Panel; Future  -     Mumps, Measles, Rubella, and Varicella Zoster, IgG; Future  -     Hepatitis Panel, Acute; Future  -     Hepatitis B Surface Antibody; Future    Infected sebaceous cyst of the neck  -     mupirocin (BACTROBAN) 2 % cream; Apply topically 3 times daily Apply topically 3 times daily. -     doxycycline hyclate (VIBRA-TABS) 100 MG tablet; Take 1 tablet by mouth in the morning and 1 tablet in the evening. Do all this for 10 days.     Educated about 2019 novel coronavirus infection  Comments:  Advised to take precautions given the increase # of case lately. I reviewed the plan of care with the patient. Patient acknowledged understanding and agreed with plan of care overall. Lifestyle measures discussed:  -Patient was advised to monitor caloric/sugar/carb intake, consume small portion sizes, well-balanced meals (nataliia with fruits & vegetables), and exercise at least 150 min/week. We discussed also reviewing nutritional labels before purchasing a product and for simplicity sake, purchase the food product that has overall less of everything. It is also recommended to avoid frequent snacking and late night eating. Dental exam: recommended every 6 months as well as directed by dentist    Visual exam: recommended annually    Medications Discontinued During This Encounter   Medication Reason    mupirocin (BACTROBAN) 2 % cream REORDER        General information on medications:  -When it comes to medications, whether with starting or adding a new medication or increasing the dose of a current medication, the benefits and risks have to always be considered and weighed over, especially if one is taking other medications as well. -There are no medications that have no side effects and that there is always a risk involved with taking a medication.    -If a side effect were to occur with starting a new medication or with increasing the dose of a current medication that either the medication can be totally discontinued altogether or simply decrease the dose of it and if this would be the case a follow-up appointment would be deemed necessary.    -The drug allergy list will then be updated with the corresponding side effect(s) if it's deemed to be a true 'drug allergy'.     -The most common adverse effects of medication(s) were addressed at today's visit.    -Lastly, the coverage status of a medication may vary from insurance to insurance and the only way to verify if the medication is covered is to send an actual prescription in.    -The drug formulary of each insurance changes without any warning or notification to the healthcare provider let alone the pharmacy.  -The cost of medications vary from insurance to insurance and the cost is always subject to change just like the drug formulary. Follow-up: No follow-ups on file. .     Patient was informed that if his or her symptoms worsen to follow up with me sooner or go to the nearest ER if the symptoms are very significant and warrant higher level of care. Regarding my note:  -This note was composed (by me only and not with assistance via a scribe) to the best of my knowledge and recollection of the encounter with the patient using one of my own customized note templates utilizing a combination of typing and dictating with the [address] [place] [address] speech recognition software. As a result, the note may possibly contain various errors (e.g. spelling, grammar, and non-sensible words/phrases/statements) despite reviewing the note prior to signing it for completion. Time spent includes some or all of the following, both face-to-face time and non face-to-face time, but is not limited to:  [x] Preparing to see the patient by reviewing medical records available (notes, labs, imaging, etc.) prior to seeing the patient. [x] Obtaining and/or reviewing the history from the patient. [x] Performing a medically appropriate examination. [x] Ordering of relevant lab work, medications, referrals, or procedures. [x] Discussing patient's medical issues and formulating an assessment and plan. [x] Reviewing plan of care with patient. Answering any questions or concerns. [x] Documentation within the electronic health record (EHR)  [] Reviewing records of history relevant to patient's issues after seeing the patient.   [] Discussion or coordination of care with other health care professionals  [] Other: Harvinder Aviles M.D.   [address] [place] [address]    Electronically signed by Carrie Lizama MD M.D. on 8/3/2022 at 4:36 PM.

## 2022-08-03 ENCOUNTER — OFFICE VISIT (OUTPATIENT)
Dept: FAMILY MEDICINE CLINIC | Age: 42
End: 2022-08-03
Payer: COMMERCIAL

## 2022-08-03 VITALS
WEIGHT: 139.4 LBS | DIASTOLIC BLOOD PRESSURE: 64 MMHG | BODY MASS INDEX: 20.59 KG/M2 | HEART RATE: 67 BPM | SYSTOLIC BLOOD PRESSURE: 122 MMHG | TEMPERATURE: 98 F | OXYGEN SATURATION: 98 %

## 2022-08-03 DIAGNOSIS — L72.3 INFECTED SEBACEOUS CYST: ICD-10-CM

## 2022-08-03 DIAGNOSIS — Z00.00 ENCOUNTER FOR PREVENTATIVE ADULT HEALTH CARE EXAMINATION: Primary | ICD-10-CM

## 2022-08-03 DIAGNOSIS — K21.9 LPRD (LARYNGOPHARYNGEAL REFLUX DISEASE): ICD-10-CM

## 2022-08-03 DIAGNOSIS — L08.9 INFECTED SEBACEOUS CYST: ICD-10-CM

## 2022-08-03 DIAGNOSIS — Z71.89 EDUCATED ABOUT 2019 NOVEL CORONAVIRUS INFECTION: ICD-10-CM

## 2022-08-03 PROCEDURE — 99395 PREV VISIT EST AGE 18-39: CPT | Performed by: FAMILY MEDICINE

## 2022-08-03 RX ORDER — MUPIROCIN CALCIUM 20 MG/G
CREAM TOPICAL 3 TIMES DAILY
Qty: 30 G | Refills: 0 | Status: SHIPPED | OUTPATIENT
Start: 2022-08-03 | End: 2022-10-19 | Stop reason: ALTCHOICE

## 2022-08-03 RX ORDER — MUPIROCIN CALCIUM 20 MG/G
CREAM TOPICAL 3 TIMES DAILY
COMMUNITY
End: 2022-08-03 | Stop reason: SDUPTHER

## 2022-08-03 RX ORDER — FAMOTIDINE 20 MG/1
20 TABLET, FILM COATED ORAL 2 TIMES DAILY
Qty: 60 TABLET | Refills: 5 | Status: SHIPPED | OUTPATIENT
Start: 2022-08-03

## 2022-08-03 RX ORDER — DOXYCYCLINE HYCLATE 100 MG
100 TABLET ORAL EVERY 12 HOURS
Qty: 20 TABLET | Refills: 0 | Status: SHIPPED | OUTPATIENT
Start: 2022-08-03 | End: 2022-08-13

## 2022-08-03 ASSESSMENT — ENCOUNTER SYMPTOMS
NAUSEA: 0
TROUBLE SWALLOWING: 0
RHINORRHEA: 0
DIARRHEA: 0
BLOOD IN STOOL: 0
SHORTNESS OF BREATH: 0
BACK PAIN: 0
CONSTIPATION: 0
ABDOMINAL DISTENTION: 0
WHEEZING: 0
VOMITING: 0
SINUS PRESSURE: 0
ABDOMINAL PAIN: 0
COUGH: 0
CHEST TIGHTNESS: 0

## 2022-08-03 ASSESSMENT — PATIENT HEALTH QUESTIONNAIRE - PHQ9
6. FEELING BAD ABOUT YOURSELF - OR THAT YOU ARE A FAILURE OR HAVE LET YOURSELF OR YOUR FAMILY DOWN: 0
9. THOUGHTS THAT YOU WOULD BE BETTER OFF DEAD, OR OF HURTING YOURSELF: 0
1. LITTLE INTEREST OR PLEASURE IN DOING THINGS: 0
SUM OF ALL RESPONSES TO PHQ9 QUESTIONS 1 & 2: 0
SUM OF ALL RESPONSES TO PHQ QUESTIONS 1-9: 1
SUM OF ALL RESPONSES TO PHQ QUESTIONS 1-9: 1
10. IF YOU CHECKED OFF ANY PROBLEMS, HOW DIFFICULT HAVE THESE PROBLEMS MADE IT FOR YOU TO DO YOUR WORK, TAKE CARE OF THINGS AT HOME, OR GET ALONG WITH OTHER PEOPLE: 0
3. TROUBLE FALLING OR STAYING ASLEEP: 1
2. FEELING DOWN, DEPRESSED OR HOPELESS: 0
SUM OF ALL RESPONSES TO PHQ QUESTIONS 1-9: 1
7. TROUBLE CONCENTRATING ON THINGS, SUCH AS READING THE NEWSPAPER OR WATCHING TELEVISION: 0
4. FEELING TIRED OR HAVING LITTLE ENERGY: 0
5. POOR APPETITE OR OVEREATING: 0
SUM OF ALL RESPONSES TO PHQ QUESTIONS 1-9: 1
8. MOVING OR SPEAKING SO SLOWLY THAT OTHER PEOPLE COULD HAVE NOTICED. OR THE OPPOSITE, BEING SO FIGETY OR RESTLESS THAT YOU HAVE BEEN MOVING AROUND A LOT MORE THAN USUAL: 0

## 2022-08-30 DIAGNOSIS — L72.3 INFECTED SEBACEOUS CYST: ICD-10-CM

## 2022-08-30 DIAGNOSIS — L08.9 INFECTED SEBACEOUS CYST: ICD-10-CM

## 2022-08-30 NOTE — TELEPHONE ENCOUNTER
[employer] called on behalf of PT to know if they can change the following medication for cream into an ointment because it will be less expensive for him. Please change and advise.     mupirocin (BACTROBAN) 2 % cream [[id number]]     Dose: -- Route: Topical Frequency: 3 TIMES DAILY   Dispense Quantity: 30 g Refills: 0      LAST OV: 08/03/22  NEXT OV: 09/28/22  LAST FILLED: 08/03/22

## 2022-10-16 NOTE — PROGRESS NOTES
Sarbjit Arredondo   38 y.o. male   1980    HPI:    Patient was last seen by me on 8/3/2022. During our last office visit:   -Seen for his annual physical.  -The main issue that was addressed was a mass of his right lateral posterior neck measuring approximately 1.25x1.25 cm elliptical shaped nodule - pus was elicited with squeezing. The patient was advised to use his left upper ibuprofen that he obtained from his father. I had prescribed him a 10-day course of doxycycline. He was advised to follow-up for this issue. Reason(s) for visit:   Chief Complaint   Patient presents with    Follow-up    Headache     In the AM.     Patient reported over past 4-5 days, patient has had issues of alternating betwee chills and diaphoresis. He stated that he would go to bed cold and then later wake up covered in sweat. He denied issues of fever, SOB, productive cough, congestion, diarrhea, etc.  He stated he has been waking up with a severe headache lately. Of note, patient was exposed to COVID-19 one month ago and denied developing any symptoms afterwards. He has received at least 2 doses of COVID-19 vaccine. Regarding issue of sebaceous cyst, patient stated eh completed the medications as noted above and the cyst eventually away in time. He denied a new lesions or rashes. Patient has history of suspected LPRD. He stated that he took the Famotidine as prescribed, but some mild relief. He hasn't had issues of heartburn, hoarseness, chronic coughing, etc.    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 He has been busy. Allergies   Allergen Reactions    Seasonal        Current Outpatient Medications on File Prior to Visit   Medication Sig Dispense Refill    Acetaminophen (ACETAMIN PO) Take by mouth at bedtime      famotidine (PEPCID) 20 MG tablet Take 1 tablet by mouth in the morning and 1 tablet before bedtime.  (Patient not taking: Reported on 10/19/2022) 60 tablet 5     No current facility-administered medications on file prior to visit. Family History   Problem Relation Age of Onset    Glaucoma Mother 79    Crohn's Disease Mother     Diabetes type 2  Father 46       Social History     Tobacco Use    Smoking status: Never    Smokeless tobacco: Never   Substance Use Topics    Alcohol use: Yes        Lab Results   Component Value Date    WBC 5.5 02/28/2017    HGB 14.8 02/28/2017    HCT 43.1 02/28/2017    MCV 92.2 02/28/2017     02/28/2017         Chemistry        Component Value Date/Time     02/28/2017 1643    K 4.2 02/28/2017 1643    CL 99 02/28/2017 1643    CO2 25 02/28/2017 1643    BUN 14 02/28/2017 1643    CREATININE 0.9 02/28/2017 1643        Component Value Date/Time    CALCIUM 9.6 02/28/2017 1643    ALKPHOS 53 02/28/2017 1643    AST 22 02/28/2017 1643    ALT 23 02/28/2017 1643    BILITOT 0.6 02/28/2017 1643          Lab Results   Component Value Date    ALT 23 02/28/2017    AST 22 02/28/2017    ALKPHOS 53 02/28/2017    BILITOT 0.6 02/28/2017       Lab Results   Component Value Date    CHOL 167 05/19/2017     Lab Results   Component Value Date    TRIG 64 05/19/2017     Lab Results   Component Value Date    HDL 51 05/19/2017     Lab Results   Component Value Date    LDLCALC 103 (H) 05/19/2017     Lab Results   Component Value Date    LABVLDL 13 05/19/2017     No results found for: CHOLHDLRATIO      Review of Systems   Constitutional:  Negative for activity change, appetite change, fatigue, fever and unexpected weight change. HENT:  Negative for congestion, rhinorrhea, sinus pressure and trouble swallowing. Respiratory:  Negative for cough, chest tightness, shortness of breath and wheezing. Cardiovascular:  Negative for chest pain, palpitations and leg swelling. Gastrointestinal:  Negative for abdominal distention, abdominal pain, blood in stool, constipation, diarrhea, nausea and vomiting.    Genitourinary:  Negative for dysuria, frequency and hematuria. Musculoskeletal:  Negative for arthralgias and back pain. Skin:  Negative for rash. Neurological:  Negative for dizziness, weakness, light-headedness, numbness and headaches. Psychiatric/Behavioral:  Positive for agitation. Negative for dysphoric mood and sleep disturbance. The patient is not nervous/anxious. Wt Readings from Last 3 Encounters:   10/19/22 137 lb 6.4 oz (62.3 kg)   08/03/22 139 lb 6.4 oz (63.2 kg)   10/27/21 140 lb 12.8 oz (63.9 kg)       BP Readings from Last 3 Encounters:   10/19/22 112/74   08/03/22 122/64   10/27/21 118/64       Pulse Readings from Last 3 Encounters:   10/19/22 68   08/03/22 67   10/27/21 62       /74   Pulse 68   Temp (!) 96.3 °F (35.7 °C)   Wt 137 lb 6.4 oz (62.3 kg)   SpO2 100%   BMI 20.29 kg/m²      Physical Exam  Vitals reviewed. Constitutional:       General: He is awake. He is not in acute distress. Appearance: He is not ill-appearing or diaphoretic. HENT:      Head: Normocephalic and atraumatic. No abrasion or masses. Hair is normal.      Right Ear: External ear normal. There is impacted cerumen. Left Ear: External ear normal. There is impacted cerumen. Nose: Nose normal.   Eyes:      General: Lids are normal. Gaze aligned appropriately. No scleral icterus. Right eye: No discharge. Left eye: No discharge. Extraocular Movements: Extraocular movements intact. Conjunctiva/sclera: Conjunctivae normal.   Neck:      Trachea: Phonation normal.   Cardiovascular:      Rate and Rhythm: Normal rate and regular rhythm. Pulmonary:      Effort: Pulmonary effort is normal. No respiratory distress. Breath sounds: No wheezing, rhonchi or rales. Abdominal:      General: Abdomen is flat. There is no distension. Palpations: Abdomen is soft. Musculoskeletal:         General: No deformity. Normal range of motion. Cervical back: Normal range of motion. No erythema. Right lower leg: No edema. Left lower leg: No edema. Skin:     Coloration: Skin is not cyanotic, jaundiced or pale. Findings: No abrasion, abscess, bruising, ecchymosis, erythema, signs of injury, laceration, lesion, petechiae, rash or wound. Neurological:      General: No focal deficit present. Mental Status: He is alert. Mental status is at baseline. GCS: GCS eye subscore is 4. GCS verbal subscore is 5. GCS motor subscore is 6. Cranial Nerves: No cranial nerve deficit, dysarthria or facial asymmetry. Motor: No weakness, tremor, atrophy or seizure activity. Coordination: Coordination normal.      Gait: Gait is intact. Psychiatric:         Attention and Perception: Attention and perception normal.         Mood and Affect: Mood and affect normal.         Speech: Speech normal.         Behavior: Behavior normal. Behavior is cooperative. Thought Content: Thought content normal.        Assessment/Plan:   Cinthia Owusu was seen today for follow-up and headache. Diagnoses and all orders for this visit:    Flu-like symptoms  Comments:  I suspect patient had a viral syndrome issue. He appears well and VS are normal. Advised to monitor his symptoms. Infected sebaceous cyst of the neck  Comments:  Resolved. LPRD (laryngopharyngeal reflux disease)  Comments:  Advised to take H2 blocker or PPI PRN. Excessive cerumen in ear canal, bilateral  -     SC REMOVAL IMPACTED CERUMEN IRRIGATION/LVG UNILAT  -     carbamide peroxide (DEBROX) 6.5 % otic solution; Place 5 drops into the left ear 2 times daily    Need for influenza vaccination  -     Influenza, FLUCELVAX, (age 10 mo+), IM, Preservative Free, 0.5 mL    Other: reminded pt to get lab work    I reviewed the plan of care with the patient. Patient acknowledged understanding and agreed with plan of care overall.     Medications Discontinued During This Encounter   Medication Reason    mupirocin (BACTROBAN) 2 % cream Therapy completed    IBUPROFEN PO LIST CLEANUP        General information on medications:  -When it comes to medications, whether with starting or adding a new medication or increasing the dose of a current medication, the benefits and risks have to always be considered and weighed over, especially if one is taking other medications as well. -There are no medications that have no side effects and that there is always a risk involved with taking a medication.    -If a side effect were to occur with starting a new medication or with increasing the dose of a current medication that either the medication can be totally discontinued altogether or simply decrease the dose of it and if this would be the case a follow-up appointment would be deemed necessary.    -The drug allergy list will then be updated with the corresponding side effect(s) if it's deemed to be a true 'drug allergy'. -The most common adverse effects of medication(s) were addressed at today's visit.    -Lastly, the coverage status of a medication may vary from insurance to insurance and the only way to verify if the medication is covered is to send an actual prescription in.    -The drug formulary of each insurance changes without any warning or notification to the healthcare provider let alone the pharmacy.  -The cost of medications vary from insurance to insurance and the cost is always subject to change just like the drug formulary. Follow-up: Return in about 10 months (around 8/24/2023) for annual physical..     Patient was informed that if his or her symptoms worsen to follow up with me sooner or go to the nearest ER if the symptoms are very significant and warrant higher level of care.     Regarding my note:  -This note was composed (by me only and not with assistance via a scribe) to the best of my knowledge and recollection of the encounter with the patient using one of my own customized note templates utilizing a combination of typing and dictating with the [address] [place] [address] speech recognition software. As a result, the note may possibly contain various errors (e.g. spelling, grammar, and non-sensible words/phrases/statements) despite reviewing the note prior to signing it for completion. Time spent includes some or all of the following, both face-to-face time and non face-to-face time, but is not limited to:  [x] Preparing to see the patient by reviewing medical records available (notes, labs, imaging, etc.) prior to seeing the patient. [x] Obtaining and/or reviewing the history from the patient. [x] Performing a medically appropriate examination. [x] Ordering of relevant lab work, medications, referrals, or procedures. [x] Discussing patient's medical issues and formulating an assessment and plan. [x] Reviewing plan of care with patient. Answering any questions or concerns. [x] Documentation within the electronic health record (EHR)  [] Reviewing records of history relevant to patient's issues after seeing the patient. [] Discussion or coordination of care with other health care professionals  [x] Other: length office visit - 15 minutes.  -I spent a significant amount of time discussing various issues as noted above and also with formulating a treatment plan for each specific issue. Patient was given the opportunity to ask me any questions and address any concerns/issues. I also reviewed lab work (if available) as well as prior notes from PCP and/or other specialists if available. Harvinder Hall M.D.   [address] [place] [address]    Electronically signed by Sarbjit Barba MD M.D. on 10/19/2022 at 10:39 PM.

## 2022-10-19 ENCOUNTER — OFFICE VISIT (OUTPATIENT)
Dept: FAMILY MEDICINE CLINIC | Age: 42
End: 2022-10-19
Payer: COMMERCIAL

## 2022-10-19 VITALS
WEIGHT: 137.4 LBS | DIASTOLIC BLOOD PRESSURE: 74 MMHG | OXYGEN SATURATION: 100 % | BODY MASS INDEX: 20.29 KG/M2 | SYSTOLIC BLOOD PRESSURE: 112 MMHG | TEMPERATURE: 96.3 F | HEART RATE: 68 BPM

## 2022-10-19 DIAGNOSIS — L08.9 INFECTED SEBACEOUS CYST: ICD-10-CM

## 2022-10-19 DIAGNOSIS — Z23 NEED FOR INFLUENZA VACCINATION: ICD-10-CM

## 2022-10-19 DIAGNOSIS — R68.89 FLU-LIKE SYMPTOMS: Primary | ICD-10-CM

## 2022-10-19 DIAGNOSIS — H61.23 EXCESSIVE CERUMEN IN EAR CANAL, BILATERAL: ICD-10-CM

## 2022-10-19 DIAGNOSIS — K21.9 LPRD (LARYNGOPHARYNGEAL REFLUX DISEASE): ICD-10-CM

## 2022-10-19 DIAGNOSIS — L72.3 INFECTED SEBACEOUS CYST: ICD-10-CM

## 2022-10-19 PROCEDURE — 1036F TOBACCO NON-USER: CPT | Performed by: FAMILY MEDICINE

## 2022-10-19 PROCEDURE — G8482 FLU IMMUNIZE ORDER/ADMIN: HCPCS | Performed by: FAMILY MEDICINE

## 2022-10-19 PROCEDURE — G8420 CALC BMI NORM PARAMETERS: HCPCS | Performed by: FAMILY MEDICINE

## 2022-10-19 PROCEDURE — 90471 IMMUNIZATION ADMIN: CPT | Performed by: FAMILY MEDICINE

## 2022-10-19 PROCEDURE — 69209 REMOVE IMPACTED EAR WAX UNI: CPT | Performed by: FAMILY MEDICINE

## 2022-10-19 PROCEDURE — 90674 CCIIV4 VAC NO PRSV 0.5 ML IM: CPT | Performed by: FAMILY MEDICINE

## 2022-10-19 PROCEDURE — 99213 OFFICE O/P EST LOW 20 MIN: CPT | Performed by: FAMILY MEDICINE

## 2022-10-19 PROCEDURE — G8427 DOCREV CUR MEDS BY ELIG CLIN: HCPCS | Performed by: FAMILY MEDICINE

## 2022-10-19 ASSESSMENT — ENCOUNTER SYMPTOMS
RHINORRHEA: 0
BLOOD IN STOOL: 0
SHORTNESS OF BREATH: 0
NAUSEA: 0
ABDOMINAL DISTENTION: 0
DIARRHEA: 0
WHEEZING: 0
VOMITING: 0
SINUS PRESSURE: 0
ABDOMINAL PAIN: 0
TROUBLE SWALLOWING: 0
CHEST TIGHTNESS: 0
BACK PAIN: 0
COUGH: 0
CONSTIPATION: 0

## 2022-11-04 DIAGNOSIS — Z00.00 ENCOUNTER FOR PREVENTATIVE ADULT HEALTH CARE EXAMINATION: ICD-10-CM

## 2022-11-04 LAB
ALBUMIN SERPL-MCNC: 4.5 G/DL (ref 3.4–5)
ALP BLD-CCNC: 196 U/L (ref 40–129)
ALT SERPL-CCNC: 124 U/L (ref 10–40)
ANION GAP SERPL CALCULATED.3IONS-SCNC: 10 MMOL/L (ref 3–16)
AST SERPL-CCNC: 51 U/L (ref 15–37)
ATYPICAL LYMPHOCYTE RELATIVE PERCENT: 5 % (ref 0–6)
BANDED NEUTROPHILS RELATIVE PERCENT: 1 % (ref 0–7)
BASOPHILS ABSOLUTE: 0.1 K/UL (ref 0–0.2)
BASOPHILS RELATIVE PERCENT: 2 %
BILIRUB SERPL-MCNC: 0.9 MG/DL (ref 0–1)
BILIRUBIN DIRECT: 0.3 MG/DL (ref 0–0.3)
BILIRUBIN, INDIRECT: 0.6 MG/DL (ref 0–1)
BUN BLDV-MCNC: 16 MG/DL (ref 7–20)
CALCIUM SERPL-MCNC: 9.6 MG/DL (ref 8.3–10.6)
CHLORIDE BLD-SCNC: 103 MMOL/L (ref 99–110)
CHOLESTEROL, TOTAL: 208 MG/DL (ref 0–199)
CO2: 27 MMOL/L (ref 21–32)
CREAT SERPL-MCNC: 1.1 MG/DL (ref 0.9–1.3)
EOSINOPHILS ABSOLUTE: 0 K/UL (ref 0–0.6)
EOSINOPHILS RELATIVE PERCENT: 0 %
GFR SERPL CREATININE-BSD FRML MDRD: >60 ML/MIN/{1.73_M2}
GLUCOSE BLD-MCNC: 89 MG/DL (ref 70–99)
HCT VFR BLD CALC: 45.5 % (ref 40.5–52.5)
HDLC SERPL-MCNC: 32 MG/DL (ref 40–60)
HEMOGLOBIN: 15.4 G/DL (ref 13.5–17.5)
LDL CHOLESTEROL CALCULATED: 147 MG/DL
LYMPHOCYTES ABSOLUTE: 3.1 K/UL (ref 1–5.1)
LYMPHOCYTES RELATIVE PERCENT: 54 %
MCH RBC QN AUTO: 31 PG (ref 26–34)
MCHC RBC AUTO-ENTMCNC: 33.9 G/DL (ref 31–36)
MCV RBC AUTO: 91.3 FL (ref 80–100)
MONOCYTES ABSOLUTE: 0.6 K/UL (ref 0–1.3)
MONOCYTES RELATIVE PERCENT: 11 %
NEUTROPHILS ABSOLUTE: 1.5 K/UL (ref 1.7–7.7)
NEUTROPHILS RELATIVE PERCENT: 27 %
PDW BLD-RTO: 14.3 % (ref 12.4–15.4)
PHOSPHORUS: 2.9 MG/DL (ref 2.5–4.9)
PLATELET # BLD: 325 K/UL (ref 135–450)
PLATELET SLIDE REVIEW: ADEQUATE
PMV BLD AUTO: 6.5 FL (ref 5–10.5)
POTASSIUM SERPL-SCNC: 5.2 MMOL/L (ref 3.5–5.1)
RBC # BLD: 4.98 M/UL (ref 4.2–5.9)
SLIDE REVIEW: ABNORMAL
SODIUM BLD-SCNC: 140 MMOL/L (ref 136–145)
T4 FREE: 1.4 NG/DL (ref 0.9–1.8)
TOTAL PROTEIN: 7.5 G/DL (ref 6.4–8.2)
TRIGL SERPL-MCNC: 143 MG/DL (ref 0–150)
TSH SERPL DL<=0.05 MIU/L-ACNC: 2.93 UIU/ML (ref 0.27–4.2)
VLDLC SERPL CALC-MCNC: 29 MG/DL
WBC # BLD: 5.3 K/UL (ref 4–11)

## 2022-11-05 DIAGNOSIS — R74.8 ELEVATED SERUM ALKALINE PHOSPHATASE LEVEL: ICD-10-CM

## 2022-11-05 DIAGNOSIS — R79.89 ABNORMAL LFTS (LIVER FUNCTION TESTS): Primary | ICD-10-CM

## 2022-11-05 DIAGNOSIS — D70.9 NEUTROPENIA, UNSPECIFIED TYPE (HCC): ICD-10-CM

## 2022-11-05 LAB
ESTIMATED AVERAGE GLUCOSE: 102.5 MG/DL
HAV IGM SER IA-ACNC: NORMAL
HBA1C MFR BLD: 5.2 %
HBV SURFACE AB TITR SER: 51.65 MIU/ML
HEPATITIS B CORE IGM ANTIBODY: NORMAL
HEPATITIS B SURFACE ANTIGEN INTERPRETATION: NORMAL
HEPATITIS C ANTIBODY INTERPRETATION: NORMAL
MEASLES IMMUNE (IGG): NORMAL
MUMPS AB IGG: NORMAL
RUBELLA ANTIBODY IGG: NORMAL
VARICELLA-ZOSTER VIRUS AB, IGG: NORMAL

## 2023-03-27 ENCOUNTER — TELEPHONE (OUTPATIENT)
Dept: FAMILY MEDICINE CLINIC | Age: 43
End: 2023-03-27

## 2023-03-27 NOTE — TELEPHONE ENCOUNTER
Spoke with patient states he had a nightmare and woke up with dull aching in chest to the left about 1 1/2 weeks ago. Patient states this same dull ache as occurred off/on since the nightmare. Patient denies shortness of breath, back pain, nausea, arm pain numbness or tingling. Offered appt on 3/27/2023, 3/28/2023, patient stated needed appt after 5 pm.  Patient accepted appt on 3/29/2023 @ 6 pm.    Advised patient if chest pain worsens, shortness of breath, arm pain to go to ER. Patient expresses with understanding and agrees.

## 2023-03-28 SDOH — ECONOMIC STABILITY: INCOME INSECURITY: HOW HARD IS IT FOR YOU TO PAY FOR THE VERY BASICS LIKE FOOD, HOUSING, MEDICAL CARE, AND HEATING?: SOMEWHAT HARD

## 2023-03-28 SDOH — ECONOMIC STABILITY: TRANSPORTATION INSECURITY
IN THE PAST 12 MONTHS, HAS LACK OF TRANSPORTATION KEPT YOU FROM MEETINGS, WORK, OR FROM GETTING THINGS NEEDED FOR DAILY LIVING?: NO

## 2023-03-28 SDOH — ECONOMIC STABILITY: FOOD INSECURITY: WITHIN THE PAST 12 MONTHS, THE FOOD YOU BOUGHT JUST DIDN'T LAST AND YOU DIDN'T HAVE MONEY TO GET MORE.: NEVER TRUE

## 2023-03-28 SDOH — ECONOMIC STABILITY: HOUSING INSECURITY
IN THE LAST 12 MONTHS, WAS THERE A TIME WHEN YOU DID NOT HAVE A STEADY PLACE TO SLEEP OR SLEPT IN A SHELTER (INCLUDING NOW)?: NO

## 2023-03-28 SDOH — ECONOMIC STABILITY: FOOD INSECURITY: WITHIN THE PAST 12 MONTHS, YOU WORRIED THAT YOUR FOOD WOULD RUN OUT BEFORE YOU GOT MONEY TO BUY MORE.: NEVER TRUE

## 2023-03-29 ENCOUNTER — OFFICE VISIT (OUTPATIENT)
Dept: FAMILY MEDICINE CLINIC | Age: 43
End: 2023-03-29
Payer: COMMERCIAL

## 2023-03-29 VITALS
WEIGHT: 135.6 LBS | HEART RATE: 69 BPM | RESPIRATION RATE: 18 BRPM | TEMPERATURE: 97.8 F | SYSTOLIC BLOOD PRESSURE: 110 MMHG | HEIGHT: 69 IN | DIASTOLIC BLOOD PRESSURE: 64 MMHG | BODY MASS INDEX: 20.08 KG/M2 | OXYGEN SATURATION: 99 %

## 2023-03-29 DIAGNOSIS — R07.9 CHEST PAIN, UNSPECIFIED TYPE: Primary | ICD-10-CM

## 2023-03-29 DIAGNOSIS — F41.9 ANXIETY: ICD-10-CM

## 2023-03-29 PROCEDURE — 99203 OFFICE O/P NEW LOW 30 MIN: CPT | Performed by: FAMILY MEDICINE

## 2023-03-29 PROCEDURE — 93000 ELECTROCARDIOGRAM COMPLETE: CPT | Performed by: FAMILY MEDICINE

## 2023-03-29 ASSESSMENT — PATIENT HEALTH QUESTIONNAIRE - PHQ9
10. IF YOU CHECKED OFF ANY PROBLEMS, HOW DIFFICULT HAVE THESE PROBLEMS MADE IT FOR YOU TO DO YOUR WORK, TAKE CARE OF THINGS AT HOME, OR GET ALONG WITH OTHER PEOPLE: 0
3. TROUBLE FALLING OR STAYING ASLEEP: 0
SUM OF ALL RESPONSES TO PHQ9 QUESTIONS 1 & 2: 0
8. MOVING OR SPEAKING SO SLOWLY THAT OTHER PEOPLE COULD HAVE NOTICED. OR THE OPPOSITE, BEING SO FIGETY OR RESTLESS THAT YOU HAVE BEEN MOVING AROUND A LOT MORE THAN USUAL: 0
1. LITTLE INTEREST OR PLEASURE IN DOING THINGS: 0
9. THOUGHTS THAT YOU WOULD BE BETTER OFF DEAD, OR OF HURTING YOURSELF: 0
SUM OF ALL RESPONSES TO PHQ QUESTIONS 1-9: 0
SUM OF ALL RESPONSES TO PHQ QUESTIONS 1-9: 0
2. FEELING DOWN, DEPRESSED OR HOPELESS: 0
7. TROUBLE CONCENTRATING ON THINGS, SUCH AS READING THE NEWSPAPER OR WATCHING TELEVISION: 0
6. FEELING BAD ABOUT YOURSELF - OR THAT YOU ARE A FAILURE OR HAVE LET YOURSELF OR YOUR FAMILY DOWN: 0
5. POOR APPETITE OR OVEREATING: 0
SUM OF ALL RESPONSES TO PHQ QUESTIONS 1-9: 0
4. FEELING TIRED OR HAVING LITTLE ENERGY: 0
SUM OF ALL RESPONSES TO PHQ QUESTIONS 1-9: 0

## 2023-04-02 ASSESSMENT — ENCOUNTER SYMPTOMS
ABDOMINAL PAIN: 0
CHEST TIGHTNESS: 0
DIARRHEA: 0
BLOOD IN STOOL: 0
CONSTIPATION: 0
SHORTNESS OF BREATH: 0
COUGH: 0

## 2023-04-02 NOTE — PROGRESS NOTES
SUBJECTIVE:  Fede Belgrade   1980   male   Allergies   Allergen Reactions    Seasonal        Chief Complaint   Patient presents with    Chest Pain     1 1/2 weeks on and off         Patient Active Problem List    Diagnosis Date Noted    Dysthymia 05/20/2017       HPI   Is here today with 1 week history of intermittent chest pain. Patient reports he has been experiencing intermittent/quick episodes of left chest discomfort for the last 1-1/2 weeks. Can happen 1-2 times a day. Symptoms only last for a few seconds. No associated shortness of breath or diaphoresis or pain radiating to jaw neck or shoulder. No nausea or vomiting. Symptoms started when he started doing push-ups for strengthening and exercise. He does not recall injuring himself while doing push-ups. He has not had any symptoms of URI or cough or wheezing or shortness of breath. Denies any GI complaints. Chest pain does not occur after eating or with exertion. He does not have chest pain at this time. He has noticed some bouts of anxiety and he is not sure if chest pain may be related to that. No other concerns or questions today.     Past Medical History:   Diagnosis Date    Abdominal pain, chronic, right lower quadrant     Dysthymia 5/20/2017     Social History     Socioeconomic History    Marital status: Single     Spouse name: Not on file    Number of children: 0    Years of education: Not on file    Highest education level: Not on file   Occupational History    Occupation: [employer]     Occupation: Previously a lab    Tobacco Use    Smoking status: Never    Smokeless tobacco: Never   Vaping Use    Vaping Use: Never used   Substance and Sexual Activity    Alcohol use: Yes    Drug use: No    Sexual activity: Never   Other Topics Concern    Not on file   Social History Narrative    Not on file     Social Determinants of Health     Financial Resource Strain: Not on file   Food Insecurity: Not on file

## 2023-08-15 NOTE — PROGRESS NOTES
medication is covered is to send an actual prescription in.    -The drug formulary of each insurance changes without any warning or notification to the healthcare provider let alone the pharmacy.  -The cost of medications vary from insurance to insurance and the cost is always subject to change just like the drug formulary. Follow-up: Return in about 1 year (around 8/21/2024) for annual physical..     Patient was informed that if his or her symptoms worsen to follow up with me sooner or go to the nearest ER if the symptoms are very significant and warrant higher level of care. Regarding my note:  -This note was composed (by me only and not with assistance via a scribe) to the best of my knowledge and recollection of the encounter with the patient using one of my own customized note templates utilizing a combination of typing and dictating with the [employer] speech recognition software. As a result, the note may possibly contain various errors (e.g. spelling, grammar, and non-sensible words/phrases/statements) despite reviewing the note prior to signing it for completion. Time spent includes some or all of the following, both face-to-face time and non face-to-face time, but is not limited to:  [x] Preparing to see the patient by reviewing medical records available (notes, labs, imaging, etc.) prior to seeing the patient. [x] Obtaining and/or reviewing the history from the patient. [x] Performing a medically appropriate examination. [x] Ordering of relevant lab work, medications, referrals, or procedures. [x] Discussing patient's medical issues and formulating an assessment and plan. [x] Reviewing plan of care with patient. Answering any questions or concerns. [x] Documentation within the electronic health record (EHR). [] Reviewing records of history relevant to patient's issues after seeing the patient.   [] Discussion or coordination of care with other health care

## 2023-08-21 ENCOUNTER — OFFICE VISIT (OUTPATIENT)
Dept: FAMILY MEDICINE CLINIC | Age: 43
End: 2023-08-21
Payer: COMMERCIAL

## 2023-08-21 VITALS
HEART RATE: 60 BPM | WEIGHT: 135.4 LBS | OXYGEN SATURATION: 97 % | SYSTOLIC BLOOD PRESSURE: 110 MMHG | TEMPERATURE: 98 F | BODY MASS INDEX: 20 KG/M2 | DIASTOLIC BLOOD PRESSURE: 62 MMHG

## 2023-08-21 DIAGNOSIS — Z91.89 RISK OF MYOCARDIAL INFARCTION OR STROKE LESS THAN 7.5% IN NEXT 10 YEARS: ICD-10-CM

## 2023-08-21 DIAGNOSIS — E78.2 MIXED HYPERLIPIDEMIA: ICD-10-CM

## 2023-08-21 DIAGNOSIS — Z13.21 SCREENING FOR ENDOCRINE, NUTRITIONAL, METABOLIC AND IMMUNITY DISORDER: ICD-10-CM

## 2023-08-21 DIAGNOSIS — Z13.29 SCREENING FOR ENDOCRINE, NUTRITIONAL, METABOLIC AND IMMUNITY DISORDER: ICD-10-CM

## 2023-08-21 DIAGNOSIS — Z13.0 SCREENING FOR ENDOCRINE, NUTRITIONAL, METABOLIC AND IMMUNITY DISORDER: ICD-10-CM

## 2023-08-21 DIAGNOSIS — Z13.228 SCREENING FOR ENDOCRINE, NUTRITIONAL, METABOLIC AND IMMUNITY DISORDER: ICD-10-CM

## 2023-08-21 DIAGNOSIS — Z00.00 ENCOUNTER FOR PREVENTATIVE ADULT HEALTH CARE EXAMINATION: Primary | ICD-10-CM

## 2023-08-21 PROCEDURE — 99396 PREV VISIT EST AGE 40-64: CPT | Performed by: FAMILY MEDICINE

## 2023-08-21 ASSESSMENT — ENCOUNTER SYMPTOMS
SHORTNESS OF BREATH: 0
WHEEZING: 0
NAUSEA: 0
BACK PAIN: 0
COUGH: 0
VOMITING: 0
SINUS PRESSURE: 0
ABDOMINAL DISTENTION: 0
ABDOMINAL PAIN: 0
DIARRHEA: 0
CHEST TIGHTNESS: 0
RHINORRHEA: 0
BLOOD IN STOOL: 0
CONSTIPATION: 0
TROUBLE SWALLOWING: 0

## 2024-09-15 ASSESSMENT — PATIENT HEALTH QUESTIONNAIRE - PHQ9
6. FEELING BAD ABOUT YOURSELF - OR THAT YOU ARE A FAILURE OR HAVE LET YOURSELF OR YOUR FAMILY DOWN: NEARLY EVERY DAY
3. TROUBLE FALLING OR STAYING ASLEEP: SEVERAL DAYS
SUM OF ALL RESPONSES TO PHQ QUESTIONS 1-9: 8
SUM OF ALL RESPONSES TO PHQ QUESTIONS 1-9: 8
5. POOR APPETITE OR OVEREATING: NOT AT ALL
7. TROUBLE CONCENTRATING ON THINGS, SUCH AS READING THE NEWSPAPER OR WATCHING TELEVISION: NOT AT ALL
9. THOUGHTS THAT YOU WOULD BE BETTER OFF DEAD, OR OF HURTING YOURSELF: SEVERAL DAYS
2. FEELING DOWN, DEPRESSED OR HOPELESS: MORE THAN HALF THE DAYS
8. MOVING OR SPEAKING SO SLOWLY THAT OTHER PEOPLE COULD HAVE NOTICED. OR THE OPPOSITE, BEING SO FIGETY OR RESTLESS THAT YOU HAVE BEEN MOVING AROUND A LOT MORE THAN USUAL: NOT AT ALL
4. FEELING TIRED OR HAVING LITTLE ENERGY: NOT AT ALL
5. POOR APPETITE OR OVEREATING: NOT AT ALL
9. THOUGHTS THAT YOU WOULD BE BETTER OFF DEAD, OR OF HURTING YOURSELF: SEVERAL DAYS
10. IF YOU CHECKED OFF ANY PROBLEMS, HOW DIFFICULT HAVE THESE PROBLEMS MADE IT FOR YOU TO DO YOUR WORK, TAKE CARE OF THINGS AT HOME, OR GET ALONG WITH OTHER PEOPLE: SOMEWHAT DIFFICULT
6. FEELING BAD ABOUT YOURSELF - OR THAT YOU ARE A FAILURE OR HAVE LET YOURSELF OR YOUR FAMILY DOWN: NEARLY EVERY DAY
7. TROUBLE CONCENTRATING ON THINGS, SUCH AS READING THE NEWSPAPER OR WATCHING TELEVISION: NOT AT ALL
SUM OF ALL RESPONSES TO PHQ QUESTIONS 1-9: 7
3. TROUBLE FALLING OR STAYING ASLEEP: SEVERAL DAYS
SUM OF ALL RESPONSES TO PHQ9 QUESTIONS 1 & 2: 3
SUM OF ALL RESPONSES TO PHQ QUESTIONS 1-9: 8
8. MOVING OR SPEAKING SO SLOWLY THAT OTHER PEOPLE COULD HAVE NOTICED. OR THE OPPOSITE - BEING SO FIDGETY OR RESTLESS THAT YOU HAVE BEEN MOVING AROUND A LOT MORE THAN USUAL: NOT AT ALL
10. IF YOU CHECKED OFF ANY PROBLEMS, HOW DIFFICULT HAVE THESE PROBLEMS MADE IT FOR YOU TO DO YOUR WORK, TAKE CARE OF THINGS AT HOME, OR GET ALONG WITH OTHER PEOPLE: SOMEWHAT DIFFICULT
SUM OF ALL RESPONSES TO PHQ QUESTIONS 1-9: 8
1. LITTLE INTEREST OR PLEASURE IN DOING THINGS: SEVERAL DAYS
4. FEELING TIRED OR HAVING LITTLE ENERGY: NOT AT ALL
2. FEELING DOWN, DEPRESSED OR HOPELESS: MORE THAN HALF THE DAYS
1. LITTLE INTEREST OR PLEASURE IN DOING THINGS: SEVERAL DAYS

## 2024-09-15 ASSESSMENT — COLUMBIA-SUICIDE SEVERITY RATING SCALE - C-SSRS
1. IN THE PAST MONTH, HAVE YOU WISHED YOU WERE DEAD OR WISHED YOU COULD GO TO SLEEP AND NOT WAKE UP?: NO
2. IN THE PAST MONTH, HAVE YOU ACTUALLY HAD ANY THOUGHTS OF KILLING YOURSELF?: YES
6. IN YOUR LIFETIME, HAVE YOU EVER DONE ANYTHING, STARTED TO DO ANYTHING, OR PREPARED TO DO ANYTHING TO END YOUR LIFE?: NO
4. IN THE PAST MONTH, HAVE YOU HAD THESE THOUGHTS AND HAD SOME INTENTION OF ACTING ON THEM?: NO
3. IN THE PAST MONTH, HAVE YOU BEEN THINKING ABOUT HOW YOU MIGHT KILL YOURSELF?: YES
5. IN THE PAST MONTH, HAVE YOU STARTED TO WORK OUT OR WORKED OUT THE DETAILS OF HOW TO KILL YOURSELF? DO YOU INTEND TO CARRY OUT THIS PLAN?: NO

## 2024-09-18 ENCOUNTER — OFFICE VISIT (OUTPATIENT)
Dept: FAMILY MEDICINE CLINIC | Age: 44
End: 2024-09-18

## 2024-09-18 VITALS
SYSTOLIC BLOOD PRESSURE: 110 MMHG | OXYGEN SATURATION: 99 % | DIASTOLIC BLOOD PRESSURE: 70 MMHG | WEIGHT: 143 LBS | TEMPERATURE: 98 F | HEART RATE: 69 BPM | BODY MASS INDEX: 21.12 KG/M2

## 2024-09-18 DIAGNOSIS — Z00.00 ANNUAL PHYSICAL EXAM: Primary | ICD-10-CM

## 2024-09-18 LAB
BILIRUBIN, POC: NORMAL
BLOOD URINE, POC: NORMAL
CLARITY, POC: CLEAR
COLOR, POC: YELLOW
GLUCOSE URINE, POC: NORMAL MG/DL
KETONES, POC: NORMAL MG/DL
LEUKOCYTE EST, POC: NORMAL
NITRITE, POC: NORMAL
PH, POC: 7
PROTEIN, POC: 100 MG/DL
SPECIFIC GRAVITY, POC: 1.02
UROBILINOGEN, POC: 1 MG/DL

## 2024-09-18 SDOH — ECONOMIC STABILITY: FOOD INSECURITY: WITHIN THE PAST 12 MONTHS, YOU WORRIED THAT YOUR FOOD WOULD RUN OUT BEFORE YOU GOT MONEY TO BUY MORE.: NEVER TRUE

## 2024-09-18 SDOH — ECONOMIC STABILITY: FOOD INSECURITY: WITHIN THE PAST 12 MONTHS, THE FOOD YOU BOUGHT JUST DIDN'T LAST AND YOU DIDN'T HAVE MONEY TO GET MORE.: NEVER TRUE

## 2024-09-18 SDOH — ECONOMIC STABILITY: INCOME INSECURITY: HOW HARD IS IT FOR YOU TO PAY FOR THE VERY BASICS LIKE FOOD, HOUSING, MEDICAL CARE, AND HEATING?: NOT HARD AT ALL

## 2024-10-02 ENCOUNTER — LAB (OUTPATIENT)
Dept: FAMILY MEDICINE CLINIC | Age: 44
End: 2024-10-02
Payer: COMMERCIAL

## 2024-10-02 DIAGNOSIS — Z23 NEED FOR TDAP VACCINATION: Primary | ICD-10-CM

## 2024-10-02 PROCEDURE — 90715 TDAP VACCINE 7 YRS/> IM: CPT | Performed by: FAMILY MEDICINE

## 2024-10-02 PROCEDURE — 90471 IMMUNIZATION ADMIN: CPT | Performed by: FAMILY MEDICINE

## 2025-04-07 NOTE — PROGRESS NOTES
Luke Doll   44 y.o. male   1980    HPI:    Patient was last seen by me on 8/21/2023.      Reason(s) for visit:   Chief Complaint   Patient presents with    Sexually Transmitted Diseases     Testing    Annual Exam     Interval health history:    [x] New health issue(s)/concern(s)/update(s):    Pt requested STI workup. He recently entered in a relationship around Nov 2024. He has no prior hx of STI. He of note has immunity against Hep B (positive titer - Nov 2022). Regarding HPV vaccination, pt has unknown prior hx of receiving it. He currently denied issues of genital lesions/sores. He reported of some discomfort at the tip of his urethra. His partner denied issues of prior STI. His last episode of intercourse was over 1 year ago with his old partner.    We also discussed his most recent PHQ-9 that he had completed prior to our visit. 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 He stated overall he likes his job. Higher pay. Better benefits. Saving $ on down payment. He mentioned of positive points of getting a raise. He reported of long hx of dysthymia since his 20's or so. He's never been on anti-depressant. He's learned to deal with his emotions.        4/8/2025     5:08 PM   PHQ-9    Little interest or pleasure in doing things 1   Feeling down, depressed, or hopeless 2   Trouble falling or staying asleep, or sleeping too much 0   Feeling tired or having little energy 1   Poor appetite or overeating 1   Feeling bad about yourself - or that you are a failure or have let yourself or your family down 2   Trouble concentrating on things, such as reading the newspaper or watching television 0   Moving or speaking so slowly that other people could have noticed. Or the opposite - being so fidgety or restless that you have been moving around a lot more than usual 0   Thoughts that you would be better off

## 2025-04-08 ASSESSMENT — PATIENT HEALTH QUESTIONNAIRE - PHQ9
10. IF YOU CHECKED OFF ANY PROBLEMS, HOW DIFFICULT HAVE THESE PROBLEMS MADE IT FOR YOU TO DO YOUR WORK, TAKE CARE OF THINGS AT HOME, OR GET ALONG WITH OTHER PEOPLE: SOMEWHAT DIFFICULT
7. TROUBLE CONCENTRATING ON THINGS, SUCH AS READING THE NEWSPAPER OR WATCHING TELEVISION: NOT AT ALL
SUM OF ALL RESPONSES TO PHQ QUESTIONS 1-9: 8
5. POOR APPETITE OR OVEREATING: SEVERAL DAYS
2. FEELING DOWN, DEPRESSED OR HOPELESS: MORE THAN HALF THE DAYS
SUM OF ALL RESPONSES TO PHQ QUESTIONS 1-9: 8
7. TROUBLE CONCENTRATING ON THINGS, SUCH AS READING THE NEWSPAPER OR WATCHING TELEVISION: NOT AT ALL
SUM OF ALL RESPONSES TO PHQ QUESTIONS 1-9: 7
5. POOR APPETITE OR OVEREATING: SEVERAL DAYS
6. FEELING BAD ABOUT YOURSELF - OR THAT YOU ARE A FAILURE OR HAVE LET YOURSELF OR YOUR FAMILY DOWN: MORE THAN HALF THE DAYS
3. TROUBLE FALLING OR STAYING ASLEEP: NOT AT ALL
1. LITTLE INTEREST OR PLEASURE IN DOING THINGS: SEVERAL DAYS
4. FEELING TIRED OR HAVING LITTLE ENERGY: SEVERAL DAYS
8. MOVING OR SPEAKING SO SLOWLY THAT OTHER PEOPLE COULD HAVE NOTICED. OR THE OPPOSITE, BEING SO FIGETY OR RESTLESS THAT YOU HAVE BEEN MOVING AROUND A LOT MORE THAN USUAL: NOT AT ALL
SUM OF ALL RESPONSES TO PHQ QUESTIONS 1-9: 8
6. FEELING BAD ABOUT YOURSELF - OR THAT YOU ARE A FAILURE OR HAVE LET YOURSELF OR YOUR FAMILY DOWN: MORE THAN HALF THE DAYS
9. THOUGHTS THAT YOU WOULD BE BETTER OFF DEAD, OR OF HURTING YOURSELF: SEVERAL DAYS
9. THOUGHTS THAT YOU WOULD BE BETTER OFF DEAD, OR OF HURTING YOURSELF: SEVERAL DAYS
SUM OF ALL RESPONSES TO PHQ QUESTIONS 1-9: 8
4. FEELING TIRED OR HAVING LITTLE ENERGY: SEVERAL DAYS
3. TROUBLE FALLING OR STAYING ASLEEP: NOT AT ALL
10. IF YOU CHECKED OFF ANY PROBLEMS, HOW DIFFICULT HAVE THESE PROBLEMS MADE IT FOR YOU TO DO YOUR WORK, TAKE CARE OF THINGS AT HOME, OR GET ALONG WITH OTHER PEOPLE: SOMEWHAT DIFFICULT
8. MOVING OR SPEAKING SO SLOWLY THAT OTHER PEOPLE COULD HAVE NOTICED. OR THE OPPOSITE - BEING SO FIDGETY OR RESTLESS THAT YOU HAVE BEEN MOVING AROUND A LOT MORE THAN USUAL: NOT AT ALL
2. FEELING DOWN, DEPRESSED OR HOPELESS: MORE THAN HALF THE DAYS
1. LITTLE INTEREST OR PLEASURE IN DOING THINGS: SEVERAL DAYS

## 2025-04-08 ASSESSMENT — COLUMBIA-SUICIDE SEVERITY RATING SCALE - C-SSRS
1. IN THE PAST MONTH, HAVE YOU WISHED YOU WERE DEAD OR WISHED YOU COULD GO TO SLEEP AND NOT WAKE UP?: YES
6. IN YOUR LIFETIME, HAVE YOU EVER DONE ANYTHING, STARTED TO DO ANYTHING, OR PREPARED TO DO ANYTHING TO END YOUR LIFE?: YES
7. DID THIS OCCUR IN THE LAST THREE MONTHS: NO
2. IN THE PAST MONTH, HAVE YOU ACTUALLY HAD ANY THOUGHTS OF KILLING YOURSELF?: NO

## 2025-04-09 ENCOUNTER — OFFICE VISIT (OUTPATIENT)
Dept: FAMILY MEDICINE CLINIC | Age: 45
End: 2025-04-09
Payer: COMMERCIAL

## 2025-04-09 VITALS
HEART RATE: 82 BPM | OXYGEN SATURATION: 98 % | SYSTOLIC BLOOD PRESSURE: 110 MMHG | DIASTOLIC BLOOD PRESSURE: 62 MMHG | HEIGHT: 69 IN | BODY MASS INDEX: 20.44 KG/M2 | WEIGHT: 138 LBS

## 2025-04-09 DIAGNOSIS — Z23 IMMUNIZATION DUE: ICD-10-CM

## 2025-04-09 DIAGNOSIS — Z13.29 SCREENING FOR ENDOCRINE, NUTRITIONAL, METABOLIC AND IMMUNITY DISORDER: ICD-10-CM

## 2025-04-09 DIAGNOSIS — Z13.228 SCREENING FOR ENDOCRINE, NUTRITIONAL, METABOLIC AND IMMUNITY DISORDER: ICD-10-CM

## 2025-04-09 DIAGNOSIS — Z13.21 SCREENING FOR ENDOCRINE, NUTRITIONAL, METABOLIC AND IMMUNITY DISORDER: ICD-10-CM

## 2025-04-09 DIAGNOSIS — Z00.00 ENCOUNTER FOR PREVENTATIVE ADULT HEALTH CARE EXAMINATION: Primary | ICD-10-CM

## 2025-04-09 DIAGNOSIS — Z13.31 POSITIVE SCREENING FOR DEPRESSION ON 9-ITEM PATIENT HEALTH QUESTIONNAIRE (PHQ-9): ICD-10-CM

## 2025-04-09 DIAGNOSIS — Z71.9 HEALTH EDUCATION/COUNSELING: ICD-10-CM

## 2025-04-09 DIAGNOSIS — Z72.51 RISK FOR SEXUALLY TRANSMITTED DISEASE: ICD-10-CM

## 2025-04-09 DIAGNOSIS — Z13.0 SCREENING FOR ENDOCRINE, NUTRITIONAL, METABOLIC AND IMMUNITY DISORDER: ICD-10-CM

## 2025-04-09 DIAGNOSIS — F34.1 DYSTHYMIA: ICD-10-CM

## 2025-04-09 PROCEDURE — 90651 9VHPV VACCINE 2/3 DOSE IM: CPT | Performed by: FAMILY MEDICINE

## 2025-04-09 PROCEDURE — 90471 IMMUNIZATION ADMIN: CPT | Performed by: FAMILY MEDICINE

## 2025-04-09 PROCEDURE — 99396 PREV VISIT EST AGE 40-64: CPT | Performed by: FAMILY MEDICINE

## 2025-04-09 SDOH — ECONOMIC STABILITY: FOOD INSECURITY: WITHIN THE PAST 12 MONTHS, THE FOOD YOU BOUGHT JUST DIDN'T LAST AND YOU DIDN'T HAVE MONEY TO GET MORE.: NEVER TRUE

## 2025-04-09 SDOH — ECONOMIC STABILITY: FOOD INSECURITY: WITHIN THE PAST 12 MONTHS, YOU WORRIED THAT YOUR FOOD WOULD RUN OUT BEFORE YOU GOT MONEY TO BUY MORE.: NEVER TRUE

## 2025-04-09 ASSESSMENT — ENCOUNTER SYMPTOMS
ABDOMINAL DISTENTION: 0
BLOOD IN STOOL: 0
NAUSEA: 0
VOMITING: 0
CHEST TIGHTNESS: 0
SHORTNESS OF BREATH: 0
WHEEZING: 0
BACK PAIN: 0
ABDOMINAL PAIN: 0
RHINORRHEA: 0
TROUBLE SWALLOWING: 0
COUGH: 0
CONSTIPATION: 0
SINUS PRESSURE: 0
DIARRHEA: 0

## 2025-04-10 ENCOUNTER — RESULTS FOLLOW-UP (OUTPATIENT)
Dept: FAMILY MEDICINE CLINIC | Age: 45
End: 2025-04-10

## 2025-04-10 LAB
C TRACH DNA UR QL NAA+PROBE: NEGATIVE
N GONORRHOEA DNA UR QL NAA+PROBE: NEGATIVE

## 2025-04-19 ENCOUNTER — HOSPITAL ENCOUNTER (OUTPATIENT)
Age: 45
Discharge: HOME | End: 2025-04-19
Payer: COMMERCIAL

## 2025-04-19 DIAGNOSIS — Z72.51 RISK FOR SEXUALLY TRANSMITTED DISEASE: ICD-10-CM

## 2025-04-19 DIAGNOSIS — Z13.228 SCREENING FOR ENDOCRINE, NUTRITIONAL, METABOLIC AND IMMUNITY DISORDER: ICD-10-CM

## 2025-04-19 DIAGNOSIS — Z00.00 ENCOUNTER FOR PREVENTATIVE ADULT HEALTH CARE EXAMINATION: ICD-10-CM

## 2025-04-19 DIAGNOSIS — Z13.21 SCREENING FOR ENDOCRINE, NUTRITIONAL, METABOLIC AND IMMUNITY DISORDER: ICD-10-CM

## 2025-04-19 DIAGNOSIS — Z13.0 SCREENING FOR ENDOCRINE, NUTRITIONAL, METABOLIC AND IMMUNITY DISORDER: ICD-10-CM

## 2025-04-19 DIAGNOSIS — Z13.29 SCREENING FOR ENDOCRINE, NUTRITIONAL, METABOLIC AND IMMUNITY DISORDER: ICD-10-CM

## 2025-04-19 LAB
ALBUMIN SERPL-MCNC: 4.6 G/DL (ref 3.4–5)
ALP SERPL-CCNC: 47 U/L (ref 40–129)
ALT SERPL-CCNC: 20 U/L (ref 10–40)
ANION GAP SERPL CALCULATED.3IONS-SCNC: 9 MMOL/L (ref 3–16)
AST SERPL-CCNC: 24 U/L (ref 15–37)
BASOPHILS # BLD: 0 K/UL (ref 0–0.2)
BASOPHILS NFR BLD: 0.7 %
BILIRUB DIRECT SERPL-MCNC: 0.3 MG/DL (ref 0–0.3)
BILIRUB INDIRECT SERPL-MCNC: 0.5 MG/DL (ref 0–1)
BILIRUB SERPL-MCNC: 0.8 MG/DL (ref 0–1)
BUN SERPL-MCNC: 19 MG/DL (ref 7–20)
CALCIUM SERPL-MCNC: 9.7 MG/DL (ref 8.3–10.6)
CHLORIDE SERPL-SCNC: 107 MMOL/L (ref 99–110)
CHOLEST SERPL-MCNC: 152 MG/DL (ref 0–199)
CO2 SERPL-SCNC: 26 MMOL/L (ref 21–32)
CREAT SERPL-MCNC: 1.3 MG/DL (ref 0.9–1.3)
DEPRECATED RDW RBC AUTO: 13.4 % (ref 12.4–15.4)
EOSINOPHIL # BLD: 0 K/UL (ref 0–0.6)
EOSINOPHIL NFR BLD: 1 %
GFR SERPLBLD CREATININE-BSD FMLA CKD-EPI: 70 ML/MIN/{1.73_M2}
GLUCOSE SERPL-MCNC: 87 MG/DL (ref 70–99)
HAV IGM SERPL QL IA: NORMAL
HBV CORE IGM SERPL QL IA: NORMAL
HBV SURFACE AB SERPL IA-ACNC: 17.3 MIU/ML
HBV SURFACE AG SERPL QL IA: NORMAL
HCT VFR BLD AUTO: 41.3 % (ref 40.5–52.5)
HCV AB SERPL QL IA: NORMAL
HDLC SERPL-MCNC: 39 MG/DL (ref 40–60)
HGB BLD-MCNC: 14.2 G/DL (ref 13.5–17.5)
LDLC SERPL CALC-MCNC: 103 MG/DL
LYMPHOCYTES # BLD: 2.2 K/UL (ref 1–5.1)
LYMPHOCYTES NFR BLD: 48.1 %
MCH RBC QN AUTO: 31 PG (ref 26–34)
MCHC RBC AUTO-ENTMCNC: 34.3 G/DL (ref 31–36)
MCV RBC AUTO: 90.3 FL (ref 80–100)
MONOCYTES # BLD: 0.4 K/UL (ref 0–1.3)
MONOCYTES NFR BLD: 8.4 %
NEUTROPHILS # BLD: 1.9 K/UL (ref 1.7–7.7)
NEUTROPHILS NFR BLD: 41.8 %
PHOSPHATE SERPL-MCNC: 2.3 MG/DL (ref 2.5–4.9)
PLATELET # BLD AUTO: 262 K/UL (ref 135–450)
PMV BLD AUTO: 7.2 FL (ref 5–10.5)
POTASSIUM SERPL-SCNC: 4.2 MMOL/L (ref 3.5–5.1)
PROT SERPL-MCNC: 7.2 G/DL (ref 6.4–8.2)
RBC # BLD AUTO: 4.57 M/UL (ref 4.2–5.9)
SODIUM SERPL-SCNC: 142 MMOL/L (ref 136–145)
T4 FREE SERPL-MCNC: 1.4 NG/DL (ref 0.9–1.8)
TRIGL SERPL-MCNC: 52 MG/DL (ref 0–150)
TSH SERPL DL<=0.005 MIU/L-ACNC: 2.43 UIU/ML (ref 0.27–4.2)
VLDLC SERPL CALC-MCNC: 10 MG/DL
WBC # BLD AUTO: 4.6 K/UL (ref 4–11)

## 2025-04-19 PROCEDURE — 86696 HERPES SIMPLEX TYPE 2 TEST: CPT

## 2025-04-19 PROCEDURE — 84439 ASSAY OF FREE THYROXINE: CPT

## 2025-04-19 PROCEDURE — 86765 RUBEOLA ANTIBODY: CPT

## 2025-04-19 PROCEDURE — 80069 RENAL FUNCTION PANEL: CPT

## 2025-04-19 PROCEDURE — 86780 TREPONEMA PALLIDUM: CPT

## 2025-04-19 PROCEDURE — 86762 RUBELLA ANTIBODY: CPT

## 2025-04-19 PROCEDURE — 86787 VARICELLA-ZOSTER ANTIBODY: CPT

## 2025-04-19 PROCEDURE — 85025 COMPLETE CBC W/AUTO DIFF WBC: CPT

## 2025-04-19 PROCEDURE — 83036 HEMOGLOBIN GLYCOSYLATED A1C: CPT

## 2025-04-19 PROCEDURE — 36415 COLL VENOUS BLD VENIPUNCTURE: CPT

## 2025-04-19 PROCEDURE — 84443 ASSAY THYROID STIM HORMONE: CPT

## 2025-04-19 PROCEDURE — 86706 HEP B SURFACE ANTIBODY: CPT

## 2025-04-19 PROCEDURE — 80074 ACUTE HEPATITIS PANEL: CPT

## 2025-04-19 PROCEDURE — 86702 HIV-2 ANTIBODY: CPT

## 2025-04-19 PROCEDURE — 80076 HEPATIC FUNCTION PANEL: CPT

## 2025-04-19 PROCEDURE — 86701 HIV-1ANTIBODY: CPT

## 2025-04-19 PROCEDURE — 87390 HIV-1 AG IA: CPT

## 2025-04-19 PROCEDURE — 80061 LIPID PANEL: CPT

## 2025-04-19 PROCEDURE — 86735 MUMPS ANTIBODY: CPT

## 2025-04-19 PROCEDURE — 86695 HERPES SIMPLEX TYPE 1 TEST: CPT

## 2025-04-20 LAB
EST. AVERAGE GLUCOSE BLD GHB EST-MCNC: 91.1 MG/DL
HBA1C MFR BLD: 4.8 %
HIV 1+2 AB+HIV1 P24 AG SERPL QL IA: NORMAL
HIV 2 AB SERPL QL IA: NORMAL
HIV1 AB SERPL QL IA: NORMAL
HIV1 P24 AG SERPL QL IA: NORMAL
MEV IGG SER QL IA: NORMAL
MUV IGG SER QL IA: NORMAL
RUBV IGG SERPL QL IA: NORMAL
VZV IGG SER QL IA: NORMAL

## 2025-04-21 ENCOUNTER — RESULTS FOLLOW-UP (OUTPATIENT)
Dept: FAMILY MEDICINE CLINIC | Age: 45
End: 2025-04-21

## 2025-04-21 LAB
HSV1 GG IGG SER-ACNC: 0.5 IV
HSV2 GG IGG SER-ACNC: 0.03 IV

## 2025-04-23 LAB — T PALLIDUM IGG SER QL IF: NON REACTIVE

## 2025-05-14 ENCOUNTER — LAB (OUTPATIENT)
Dept: FAMILY MEDICINE CLINIC | Age: 45
End: 2025-05-14
Payer: COMMERCIAL

## 2025-05-14 PROCEDURE — 90471 IMMUNIZATION ADMIN: CPT | Performed by: FAMILY MEDICINE

## 2025-05-14 PROCEDURE — 90651 9VHPV VACCINE 2/3 DOSE IM: CPT | Performed by: FAMILY MEDICINE

## 2025-07-30 ENCOUNTER — PATIENT MESSAGE (OUTPATIENT)
Dept: FAMILY MEDICINE CLINIC | Age: 45
End: 2025-07-30

## 2025-07-30 DIAGNOSIS — H93.12 TINNITUS, LEFT EAR: Primary | ICD-10-CM
